# Patient Record
Sex: MALE | Race: WHITE | NOT HISPANIC OR LATINO | Employment: OTHER | RURAL
[De-identification: names, ages, dates, MRNs, and addresses within clinical notes are randomized per-mention and may not be internally consistent; named-entity substitution may affect disease eponyms.]

---

## 2020-03-24 ENCOUNTER — HISTORICAL (OUTPATIENT)
Dept: ADMINISTRATIVE | Facility: HOSPITAL | Age: 70
End: 2020-03-24

## 2020-04-28 ENCOUNTER — HISTORICAL (OUTPATIENT)
Dept: ADMINISTRATIVE | Facility: HOSPITAL | Age: 70
End: 2020-04-28

## 2020-10-23 ENCOUNTER — HISTORICAL (OUTPATIENT)
Dept: ADMINISTRATIVE | Facility: HOSPITAL | Age: 70
End: 2020-10-23

## 2021-03-30 DIAGNOSIS — R51.9 ACUTE NONINTRACTABLE HEADACHE, UNSPECIFIED HEADACHE TYPE: Primary | ICD-10-CM

## 2021-06-21 ENCOUNTER — OFFICE VISIT (OUTPATIENT)
Dept: FAMILY MEDICINE | Facility: CLINIC | Age: 71
End: 2021-06-21
Payer: COMMERCIAL

## 2021-06-21 VITALS
WEIGHT: 205 LBS | HEART RATE: 98 BPM | BODY MASS INDEX: 27.77 KG/M2 | RESPIRATION RATE: 16 BRPM | TEMPERATURE: 98 F | HEIGHT: 72 IN | OXYGEN SATURATION: 99 %

## 2021-06-21 DIAGNOSIS — J40 BRONCHITIS: Primary | ICD-10-CM

## 2021-06-21 DIAGNOSIS — Z11.52 ENCOUNTER FOR SCREENING FOR COVID-19: ICD-10-CM

## 2021-06-21 LAB
CTP QC/QA: YES
FLUAV AG NPH QL: NEGATIVE
FLUBV AG NPH QL: NEGATIVE
SARS-COV-2 AG RESP QL IA.RAPID: NEGATIVE

## 2021-06-21 PROCEDURE — 99214 OFFICE O/P EST MOD 30 MIN: CPT | Mod: ,,, | Performed by: FAMILY MEDICINE

## 2021-06-21 PROCEDURE — 87428 SARSCOV & INF VIR A&B AG IA: CPT | Mod: QW,,, | Performed by: FAMILY MEDICINE

## 2021-06-21 PROCEDURE — 99214 PR OFFICE/OUTPT VISIT, EST, LEVL IV, 30-39 MIN: ICD-10-PCS | Mod: ,,, | Performed by: FAMILY MEDICINE

## 2021-06-21 PROCEDURE — 87428 POCT SARS-COV2 (COVID) WITH FLU ANTIGEN: ICD-10-PCS | Mod: QW,,, | Performed by: FAMILY MEDICINE

## 2021-06-21 RX ORDER — BENZONATATE 100 MG/1
100 CAPSULE ORAL 3 TIMES DAILY PRN
Qty: 20 CAPSULE | Refills: 0 | Status: SHIPPED | OUTPATIENT
Start: 2021-06-21

## 2021-06-21 RX ORDER — PREDNISONE 20 MG/1
20 TABLET ORAL DAILY
Qty: 5 TABLET | Refills: 0 | Status: SHIPPED | OUTPATIENT
Start: 2021-06-21 | End: 2021-06-26

## 2021-06-21 RX ORDER — SIMVASTATIN 20 MG/1
20 TABLET, FILM COATED ORAL NIGHTLY
COMMUNITY

## 2021-06-21 RX ORDER — LISINOPRIL 10 MG/1
10 TABLET ORAL DAILY
COMMUNITY
End: 2022-08-02

## 2021-06-21 RX ORDER — ALBUTEROL SULFATE 90 UG/1
2 AEROSOL, METERED RESPIRATORY (INHALATION) EVERY 6 HOURS PRN
Qty: 8 G | Refills: 0 | Status: SHIPPED | OUTPATIENT
Start: 2021-06-21 | End: 2022-06-21

## 2021-06-21 RX ORDER — FINASTERIDE 5 MG/1
10 TABLET, FILM COATED ORAL DAILY
COMMUNITY
End: 2022-08-09 | Stop reason: SDUPTHER

## 2021-06-21 RX ORDER — AMOXICILLIN AND CLAVULANATE POTASSIUM 875; 125 MG/1; MG/1
1 TABLET, FILM COATED ORAL 2 TIMES DAILY
Qty: 14 TABLET | Refills: 0 | Status: SHIPPED | OUTPATIENT
Start: 2021-06-21 | End: 2021-06-28

## 2021-06-21 RX ORDER — ASPIRIN 81 MG/1
81 TABLET ORAL DAILY
COMMUNITY

## 2021-06-23 ENCOUNTER — HOSPITAL ENCOUNTER (EMERGENCY)
Facility: HOSPITAL | Age: 71
Discharge: HOME | End: 2021-06-23
Payer: COMMERCIAL

## 2021-06-23 VITALS
TEMPERATURE: 99 F | HEART RATE: 79 BPM | SYSTOLIC BLOOD PRESSURE: 118 MMHG | RESPIRATION RATE: 21 BRPM | OXYGEN SATURATION: 96 % | BODY MASS INDEX: 28.35 KG/M2 | DIASTOLIC BLOOD PRESSURE: 65 MMHG | WEIGHT: 209 LBS

## 2021-06-23 DIAGNOSIS — R50.9 FEVER: ICD-10-CM
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
## 2021-06-23 DIAGNOSIS — J12.9 VIRAL PNEUMONIA: Primary | ICD-10-CM

## 2021-06-23 LAB
ALBUMIN SERPL BCP-MCNC: 3.1 G/DL (ref 3.5–5)
ALBUMIN/GLOB SERPL: 0.8 {RATIO}
ALP SERPL-CCNC: 106 U/L (ref 45–115)
ALT SERPL W P-5'-P-CCNC: 112 U/L (ref 16–61)
ANION GAP SERPL CALCULATED.3IONS-SCNC: 15 MMOL/L (ref 7–16)
AST SERPL W P-5'-P-CCNC: 79 U/L (ref 15–37)
BACTERIA #/AREA URNS HPF: ABNORMAL /HPF
BASOPHILS # BLD AUTO: 0.03 K/UL (ref 0–0.2)
BASOPHILS NFR BLD AUTO: 0.4 % (ref 0–1)
BILIRUB SERPL-MCNC: 0.5 MG/DL (ref 0–1.2)
BILIRUB UR QL STRIP: NEGATIVE
BUN SERPL-MCNC: 28 MG/DL (ref 7–18)
BUN/CREAT SERPL: 24 (ref 6–20)
CALCIUM SERPL-MCNC: 8.8 MG/DL (ref 8.5–10.1)
CHLORIDE SERPL-SCNC: 102 MMOL/L (ref 98–107)
CLARITY UR: CLEAR
CO2 SERPL-SCNC: 22 MMOL/L (ref 21–32)
COLOR UR: YELLOW
CREAT SERPL-MCNC: 1.15 MG/DL (ref 0.7–1.3)
DIFFERENTIAL METHOD BLD: ABNORMAL
EOSINOPHIL # BLD AUTO: 0.1 K/UL (ref 0–0.5)
EOSINOPHIL NFR BLD AUTO: 1.4 % (ref 1–4)
EOSINOPHIL NFR BLD MANUAL: 1 % (ref 1–4)
ERYTHROCYTE [DISTWIDTH] IN BLOOD BY AUTOMATED COUNT: 12.3 % (ref 11.5–14.5)
GLOBULIN SER-MCNC: 3.7 G/DL (ref 2–4)
GLUCOSE SERPL-MCNC: 148 MG/DL (ref 74–106)
GLUCOSE UR STRIP-MCNC: NEGATIVE MG/DL
HCT VFR BLD AUTO: 41.3 % (ref 40–54)
HGB BLD-MCNC: 14.6 G/DL (ref 13.5–18)
IMM GRANULOCYTES # BLD AUTO: 0.04 K/UL (ref 0–0.04)
IMM GRANULOCYTES NFR BLD: 0.6 % (ref 0–0.4)
KETONES UR STRIP-SCNC: NEGATIVE MG/DL
LEUKOCYTE ESTERASE UR QL STRIP: NEGATIVE
LYMPHOCYTES # BLD AUTO: 0.77 K/UL (ref 1–4.8)
LYMPHOCYTES NFR BLD AUTO: 10.8 % (ref 27–41)
LYMPHOCYTES NFR BLD MANUAL: 12 % (ref 27–41)
MCH RBC QN AUTO: 30.9 PG (ref 27–31)
MCHC RBC AUTO-ENTMCNC: 35.4 G/DL (ref 32–36)
MCV RBC AUTO: 87.3 FL (ref 80–96)
MONOCYTES # BLD AUTO: 0.61 K/UL (ref 0–0.8)
MONOCYTES NFR BLD AUTO: 8.5 % (ref 2–6)
MONOCYTES NFR BLD MANUAL: 5 % (ref 2–6)
MPC BLD CALC-MCNC: 11.2 FL (ref 9.4–12.4)
MUCOUS THREADS #/AREA URNS HPF: ABNORMAL /HPF
NEUTROPHILS # BLD AUTO: 5.59 K/UL (ref 1.8–7.7)
NEUTROPHILS NFR BLD AUTO: 78.3 % (ref 53–65)
NEUTS BAND NFR BLD MANUAL: 18 % (ref 1–5)
NEUTS SEG NFR BLD MANUAL: 64 % (ref 50–62)
NITRITE UR QL STRIP: NEGATIVE
NRBC # BLD AUTO: 0 X10E3/UL
NRBC, AUTO (.00): 0 %
PH UR STRIP: 6 PH UNITS
PLATELET # BLD AUTO: 146 K/UL (ref 150–400)
PLATELET MORPHOLOGY: ABNORMAL
POTASSIUM SERPL-SCNC: 3.4 MMOL/L (ref 3.5–5.1)
PROT SERPL-MCNC: 6.8 G/DL (ref 6.4–8.2)
PROT UR QL STRIP: NEGATIVE
RBC # BLD AUTO: 4.73 M/UL (ref 4.6–6.2)
RBC # UR STRIP: ABNORMAL /UL
RBC #/AREA URNS HPF: ABNORMAL /HPF
RBC MORPH BLD: NORMAL
SODIUM SERPL-SCNC: 136 MMOL/L (ref 136–145)
SP GR UR STRIP: 1.01
SQUAMOUS #/AREA URNS LPF: ABNORMAL /LPF
UROBILINOGEN UR STRIP-ACNC: 1 MG/DL
WBC # BLD AUTO: 7.14 K/UL (ref 4.5–11)
WBC #/AREA URNS HPF: ABNORMAL /HPF

## 2021-06-23 PROCEDURE — 80053 COMPREHEN METABOLIC PANEL: CPT | Performed by: NURSE PRACTITIONER

## 2021-06-23 PROCEDURE — 96361 HYDRATE IV INFUSION ADD-ON: CPT

## 2021-06-23 PROCEDURE — 25000003 PHARM REV CODE 250: Performed by: NURSE PRACTITIONER

## 2021-06-23 PROCEDURE — 81001 URINALYSIS AUTO W/SCOPE: CPT | Performed by: NURSE PRACTITIONER

## 2021-06-23 PROCEDURE — 99283 PR EMERGENCY DEPT VISIT,LEVEL III: ICD-10-PCS | Mod: ,,, | Performed by: FAMILY MEDICINE

## 2021-06-23 PROCEDURE — 99283 EMERGENCY DEPT VISIT LOW MDM: CPT | Mod: ,,, | Performed by: FAMILY MEDICINE

## 2021-06-23 PROCEDURE — 85025 COMPLETE CBC W/AUTO DIFF WBC: CPT | Performed by: NURSE PRACTITIONER

## 2021-06-23 PROCEDURE — 36415 COLL VENOUS BLD VENIPUNCTURE: CPT | Performed by: NURSE PRACTITIONER

## 2021-06-23 PROCEDURE — 87040 BLOOD CULTURE FOR BACTERIA: CPT | Performed by: NURSE PRACTITIONER

## 2021-06-23 PROCEDURE — 96360 HYDRATION IV INFUSION INIT: CPT

## 2021-06-23 PROCEDURE — 86757 RICKETTSIA ANTIBODY: CPT | Mod: 90 | Performed by: NURSE PRACTITIONER

## 2021-06-23 PROCEDURE — 99284 EMERGENCY DEPT VISIT MOD MDM: CPT | Mod: 25

## 2021-06-23 PROCEDURE — 86617 LYME DISEASE ANTIBODY: CPT | Performed by: NURSE PRACTITIONER

## 2021-06-23 RX ORDER — POTASSIUM CHLORIDE 20 MEQ/1
20 TABLET, EXTENDED RELEASE ORAL
Status: COMPLETED | OUTPATIENT
Start: 2021-06-23 | End: 2021-06-23

## 2021-06-23 RX ORDER — TAMSULOSIN HYDROCHLORIDE 0.4 MG/1
CAPSULE ORAL
COMMUNITY
Start: 2020-09-03 | End: 2022-08-09 | Stop reason: SDUPTHER

## 2021-06-23 RX ORDER — LISINOPRIL 20 MG/1
TABLET ORAL
COMMUNITY
Start: 2020-09-03 | End: 2022-08-02

## 2021-06-23 RX ORDER — DOXYCYCLINE 100 MG/1
100 CAPSULE ORAL 2 TIMES DAILY
Qty: 28 CAPSULE | Refills: 0 | Status: SHIPPED | OUTPATIENT
Start: 2021-06-23 | End: 2021-07-07

## 2021-06-23 RX ADMIN — POTASSIUM CHLORIDE 20 MEQ: 1500 TABLET, EXTENDED RELEASE ORAL at 10:06

## 2021-06-23 RX ADMIN — SODIUM CHLORIDE 1000 ML: 9 INJECTION, SOLUTION INTRAVENOUS at 10:06

## 2021-06-24 LAB
RICK SF IGG TITR SER IF: ABNORMAL {TITER}
RICK SF IGM TITR SER IF: ABNORMAL {TITER}

## 2021-06-29 LAB
BACTERIA BLD CULT: NORMAL
BACTERIA BLD CULT: NORMAL

## 2021-06-30 LAB — B BURGDOR IGG SER QL IB: NORMAL

## 2021-07-08 DIAGNOSIS — E29.1 TESTICULAR HYPOFUNCTION: Primary | ICD-10-CM

## 2021-07-12 ENCOUNTER — TELEPHONE (OUTPATIENT)
Dept: UROLOGY | Facility: CLINIC | Age: 71
End: 2021-07-12

## 2021-07-16 ENCOUNTER — HOSPITAL ENCOUNTER (OUTPATIENT)
Dept: RADIOLOGY | Facility: HOSPITAL | Age: 71
Discharge: HOME | End: 2021-07-16
Attending: INTERNAL MEDICINE
Payer: COMMERCIAL

## 2021-07-16 DIAGNOSIS — R79.89 ABNORMAL LIVER FUNCTION TEST: ICD-10-CM

## 2021-07-16 PROCEDURE — 76700 US EXAM ABDOM COMPLETE: CPT | Mod: 26,,,

## 2021-07-16 PROCEDURE — 76700 US ABDOMEN COMPLETE: ICD-10-PCS | Mod: 26,,,

## 2021-07-16 PROCEDURE — 76700 US EXAM ABDOM COMPLETE: CPT | Mod: TC

## 2021-07-26 ENCOUNTER — OFFICE VISIT (OUTPATIENT)
Dept: INTERNAL MEDICINE | Facility: CLINIC | Age: 71
End: 2021-07-26
Payer: COMMERCIAL

## 2021-07-26 VITALS
SYSTOLIC BLOOD PRESSURE: 132 MMHG | HEART RATE: 63 BPM | DIASTOLIC BLOOD PRESSURE: 82 MMHG | HEIGHT: 72 IN | OXYGEN SATURATION: 97 % | WEIGHT: 205 LBS | BODY MASS INDEX: 27.77 KG/M2 | RESPIRATION RATE: 16 BRPM

## 2021-07-26 DIAGNOSIS — Z76.89 ENCOUNTER TO ESTABLISH CARE WITH NEW DOCTOR: Primary | ICD-10-CM

## 2021-07-26 DIAGNOSIS — N40.0 BENIGN PROSTATIC HYPERPLASIA, UNSPECIFIED WHETHER LOWER URINARY TRACT SYMPTOMS PRESENT: ICD-10-CM

## 2021-07-26 DIAGNOSIS — I10 ESSENTIAL HYPERTENSION: ICD-10-CM

## 2021-07-26 PROCEDURE — 3008F BODY MASS INDEX DOCD: CPT | Mod: ,,, | Performed by: INTERNAL MEDICINE

## 2021-07-26 PROCEDURE — 99203 OFFICE O/P NEW LOW 30 MIN: CPT | Mod: S$PBB,,, | Performed by: INTERNAL MEDICINE

## 2021-07-26 PROCEDURE — 3008F PR BODY MASS INDEX (BMI) DOCUMENTED: ICD-10-PCS | Mod: ,,, | Performed by: INTERNAL MEDICINE

## 2021-07-26 PROCEDURE — 1159F MED LIST DOCD IN RCRD: CPT | Mod: ,,, | Performed by: INTERNAL MEDICINE

## 2021-07-26 PROCEDURE — 3288F PR FALLS RISK ASSESSMENT DOCUMENTED: ICD-10-PCS | Mod: ,,, | Performed by: INTERNAL MEDICINE

## 2021-07-26 PROCEDURE — 1101F PR PT FALLS ASSESS DOC 0-1 FALLS W/OUT INJ PAST YR: ICD-10-PCS | Mod: ,,, | Performed by: INTERNAL MEDICINE

## 2021-07-26 PROCEDURE — 3288F FALL RISK ASSESSMENT DOCD: CPT | Mod: ,,, | Performed by: INTERNAL MEDICINE

## 2021-07-26 PROCEDURE — 1101F PT FALLS ASSESS-DOCD LE1/YR: CPT | Mod: ,,, | Performed by: INTERNAL MEDICINE

## 2021-07-26 PROCEDURE — 99203 PR OFFICE/OUTPT VISIT, NEW, LEVL III, 30-44 MIN: ICD-10-PCS | Mod: S$PBB,,, | Performed by: INTERNAL MEDICINE

## 2021-07-26 PROCEDURE — 1159F PR MEDICATION LIST DOCUMENTED IN MEDICAL RECORD: ICD-10-PCS | Mod: ,,, | Performed by: INTERNAL MEDICINE

## 2021-07-26 PROCEDURE — 99214 OFFICE O/P EST MOD 30 MIN: CPT | Mod: PBBFAC | Performed by: INTERNAL MEDICINE

## 2021-07-26 PROCEDURE — 1126F PR PAIN SEVERITY QUANTIFIED, NO PAIN PRESENT: ICD-10-PCS | Mod: ,,, | Performed by: INTERNAL MEDICINE

## 2021-07-26 PROCEDURE — 1126F AMNT PAIN NOTED NONE PRSNT: CPT | Mod: ,,, | Performed by: INTERNAL MEDICINE

## 2021-07-26 RX ORDER — MONTELUKAST SODIUM 10 MG/1
TABLET ORAL
COMMUNITY
Start: 2021-06-25

## 2021-07-28 ENCOUNTER — OFFICE VISIT (OUTPATIENT)
Dept: UROLOGY | Facility: CLINIC | Age: 71
End: 2021-07-28
Payer: COMMERCIAL

## 2021-07-28 VITALS
SYSTOLIC BLOOD PRESSURE: 144 MMHG | HEART RATE: 63 BPM | HEIGHT: 72 IN | WEIGHT: 211 LBS | DIASTOLIC BLOOD PRESSURE: 75 MMHG | BODY MASS INDEX: 28.58 KG/M2

## 2021-07-28 DIAGNOSIS — R53.83 FATIGUE, UNSPECIFIED TYPE: ICD-10-CM

## 2021-07-28 DIAGNOSIS — N40.1 BENIGN PROSTATIC HYPERPLASIA WITH URINARY OBSTRUCTION: ICD-10-CM

## 2021-07-28 DIAGNOSIS — N13.8 BENIGN PROSTATIC HYPERPLASIA WITH URINARY OBSTRUCTION: ICD-10-CM

## 2021-07-28 DIAGNOSIS — E29.1 TESTICULAR HYPOFUNCTION: Primary | ICD-10-CM

## 2021-07-28 PROCEDURE — 3078F DIAST BP <80 MM HG: CPT | Mod: ,,, | Performed by: UROLOGY

## 2021-07-28 PROCEDURE — 1159F PR MEDICATION LIST DOCUMENTED IN MEDICAL RECORD: ICD-10-PCS | Mod: ,,, | Performed by: UROLOGY

## 2021-07-28 PROCEDURE — 3008F PR BODY MASS INDEX (BMI) DOCUMENTED: ICD-10-PCS | Mod: ,,, | Performed by: UROLOGY

## 2021-07-28 PROCEDURE — 99213 PR OFFICE/OUTPT VISIT, EST, LEVL III, 20-29 MIN: ICD-10-PCS | Mod: S$PBB,,, | Performed by: UROLOGY

## 2021-07-28 PROCEDURE — 99214 OFFICE O/P EST MOD 30 MIN: CPT | Mod: PBBFAC | Performed by: UROLOGY

## 2021-07-28 PROCEDURE — 3077F SYST BP >= 140 MM HG: CPT | Mod: ,,, | Performed by: UROLOGY

## 2021-07-28 PROCEDURE — 3077F PR MOST RECENT SYSTOLIC BLOOD PRESSURE >= 140 MM HG: ICD-10-PCS | Mod: ,,, | Performed by: UROLOGY

## 2021-07-28 PROCEDURE — 3008F BODY MASS INDEX DOCD: CPT | Mod: ,,, | Performed by: UROLOGY

## 2021-07-28 PROCEDURE — 1101F PT FALLS ASSESS-DOCD LE1/YR: CPT | Mod: ,,, | Performed by: UROLOGY

## 2021-07-28 PROCEDURE — 99213 OFFICE O/P EST LOW 20 MIN: CPT | Mod: S$PBB,,, | Performed by: UROLOGY

## 2021-07-28 PROCEDURE — 3078F PR MOST RECENT DIASTOLIC BLOOD PRESSURE < 80 MM HG: ICD-10-PCS | Mod: ,,, | Performed by: UROLOGY

## 2021-07-28 PROCEDURE — 3288F FALL RISK ASSESSMENT DOCD: CPT | Mod: ,,, | Performed by: UROLOGY

## 2021-07-28 PROCEDURE — 3288F PR FALLS RISK ASSESSMENT DOCUMENTED: ICD-10-PCS | Mod: ,,, | Performed by: UROLOGY

## 2021-07-28 PROCEDURE — 1159F MED LIST DOCD IN RCRD: CPT | Mod: ,,, | Performed by: UROLOGY

## 2021-07-28 PROCEDURE — 1101F PR PT FALLS ASSESS DOC 0-1 FALLS W/OUT INJ PAST YR: ICD-10-PCS | Mod: ,,, | Performed by: UROLOGY

## 2021-07-28 RX ORDER — CYANOCOBALAMIN 1000 UG/ML
INJECTION, SOLUTION INTRAMUSCULAR; SUBCUTANEOUS
COMMUNITY
Start: 2021-07-24

## 2021-07-28 RX ORDER — SILDENAFIL 100 MG/1
TABLET, FILM COATED ORAL
COMMUNITY
Start: 2021-03-04

## 2021-07-28 RX ORDER — OMEPRAZOLE 20 MG/1
20 CAPSULE, DELAYED RELEASE ORAL DAILY
COMMUNITY
Start: 2021-07-24

## 2021-07-28 RX ORDER — DUTASTERIDE 0.5 MG/1
1 CAPSULE, LIQUID FILLED ORAL DAILY
COMMUNITY
Start: 2021-03-04 | End: 2022-07-08 | Stop reason: SDUPTHER

## 2021-09-30 DIAGNOSIS — E29.9 TESTICULAR DYSFUNCTION: ICD-10-CM

## 2021-09-30 DIAGNOSIS — R53.83 OTHER FATIGUE: Primary | ICD-10-CM

## 2021-11-08 ENCOUNTER — PATIENT OUTREACH (OUTPATIENT)
Dept: PRIMARY CARE CLINIC | Facility: CLINIC | Age: 71
End: 2021-11-08
Payer: COMMERCIAL

## 2021-11-22 RX ORDER — GABAPENTIN 400 MG/1
400 CAPSULE ORAL 3 TIMES DAILY
Qty: 30 CAPSULE | Refills: 1 | Status: SHIPPED | OUTPATIENT
Start: 2021-11-22 | End: 2022-11-22

## 2021-11-22 RX ORDER — ACYCLOVIR 400 MG/1
800 TABLET ORAL 4 TIMES DAILY
Qty: 40 TABLET | Refills: 1 | Status: SHIPPED | OUTPATIENT
Start: 2021-11-22 | End: 2022-11-22

## 2022-02-03 ENCOUNTER — OFFICE VISIT (OUTPATIENT)
Dept: INTERNAL MEDICINE | Facility: CLINIC | Age: 72
End: 2022-02-03
Payer: COMMERCIAL

## 2022-02-03 VITALS
BODY MASS INDEX: 29.12 KG/M2 | TEMPERATURE: 98 F | OXYGEN SATURATION: 98 % | DIASTOLIC BLOOD PRESSURE: 72 MMHG | SYSTOLIC BLOOD PRESSURE: 126 MMHG | HEIGHT: 72 IN | HEART RATE: 88 BPM | WEIGHT: 215 LBS

## 2022-02-03 DIAGNOSIS — I10 ESSENTIAL HYPERTENSION: ICD-10-CM

## 2022-02-03 DIAGNOSIS — R05.9 COUGH: ICD-10-CM

## 2022-02-03 DIAGNOSIS — E78.5 DYSLIPIDEMIA: ICD-10-CM

## 2022-02-03 DIAGNOSIS — N40.0 BENIGN PROSTATIC HYPERPLASIA, UNSPECIFIED WHETHER LOWER URINARY TRACT SYMPTOMS PRESENT: ICD-10-CM

## 2022-02-03 DIAGNOSIS — R11.0 NAUSEA: ICD-10-CM

## 2022-02-03 DIAGNOSIS — M25.562 ACUTE PAIN OF LEFT KNEE: ICD-10-CM

## 2022-02-03 DIAGNOSIS — Z09 FOLLOW UP: Primary | ICD-10-CM

## 2022-02-03 PROCEDURE — 1126F PR PAIN SEVERITY QUANTIFIED, NO PAIN PRESENT: ICD-10-PCS | Mod: ,,, | Performed by: INTERNAL MEDICINE

## 2022-02-03 PROCEDURE — 1101F PT FALLS ASSESS-DOCD LE1/YR: CPT | Mod: ,,, | Performed by: INTERNAL MEDICINE

## 2022-02-03 PROCEDURE — 3008F PR BODY MASS INDEX (BMI) DOCUMENTED: ICD-10-PCS | Mod: ,,, | Performed by: INTERNAL MEDICINE

## 2022-02-03 PROCEDURE — 1159F MED LIST DOCD IN RCRD: CPT | Mod: ,,, | Performed by: INTERNAL MEDICINE

## 2022-02-03 PROCEDURE — 3074F PR MOST RECENT SYSTOLIC BLOOD PRESSURE < 130 MM HG: ICD-10-PCS | Mod: ,,, | Performed by: INTERNAL MEDICINE

## 2022-02-03 PROCEDURE — 99214 OFFICE O/P EST MOD 30 MIN: CPT | Mod: S$PBB,,, | Performed by: INTERNAL MEDICINE

## 2022-02-03 PROCEDURE — 1126F AMNT PAIN NOTED NONE PRSNT: CPT | Mod: ,,, | Performed by: INTERNAL MEDICINE

## 2022-02-03 PROCEDURE — 3078F DIAST BP <80 MM HG: CPT | Mod: ,,, | Performed by: INTERNAL MEDICINE

## 2022-02-03 PROCEDURE — 3008F BODY MASS INDEX DOCD: CPT | Mod: ,,, | Performed by: INTERNAL MEDICINE

## 2022-02-03 PROCEDURE — 99215 OFFICE O/P EST HI 40 MIN: CPT | Mod: PBBFAC | Performed by: INTERNAL MEDICINE

## 2022-02-03 PROCEDURE — 3288F PR FALLS RISK ASSESSMENT DOCUMENTED: ICD-10-PCS | Mod: ,,, | Performed by: INTERNAL MEDICINE

## 2022-02-03 PROCEDURE — 3288F FALL RISK ASSESSMENT DOCD: CPT | Mod: ,,, | Performed by: INTERNAL MEDICINE

## 2022-02-03 PROCEDURE — 1159F PR MEDICATION LIST DOCUMENTED IN MEDICAL RECORD: ICD-10-PCS | Mod: ,,, | Performed by: INTERNAL MEDICINE

## 2022-02-03 PROCEDURE — 3078F PR MOST RECENT DIASTOLIC BLOOD PRESSURE < 80 MM HG: ICD-10-PCS | Mod: ,,, | Performed by: INTERNAL MEDICINE

## 2022-02-03 PROCEDURE — 1101F PR PT FALLS ASSESS DOC 0-1 FALLS W/OUT INJ PAST YR: ICD-10-PCS | Mod: ,,, | Performed by: INTERNAL MEDICINE

## 2022-02-03 PROCEDURE — 3074F SYST BP LT 130 MM HG: CPT | Mod: ,,, | Performed by: INTERNAL MEDICINE

## 2022-02-03 PROCEDURE — 99214 PR OFFICE/OUTPT VISIT, EST, LEVL IV, 30-39 MIN: ICD-10-PCS | Mod: S$PBB,,, | Performed by: INTERNAL MEDICINE

## 2022-02-03 RX ORDER — AMLODIPINE BESYLATE 10 MG/1
10 TABLET ORAL DAILY PRN
COMMUNITY

## 2022-02-03 RX ORDER — PREDNISONE 20 MG/1
20 TABLET ORAL DAILY
Qty: 7 TABLET | Refills: 1 | Status: SHIPPED | OUTPATIENT
Start: 2022-02-03

## 2022-02-03 RX ORDER — TESTOSTERONE GEL, 1% 10 MG/G
GEL TRANSDERMAL DAILY
COMMUNITY
End: 2022-10-10

## 2022-02-03 NOTE — PROGRESS NOTES
Subjective:       Patient ID: Alon Felipe is a 72 y.o. male.    Chief Complaint: Elevated bood pressure readings (Highest reading 198/10/ on 02/01. Lowest reading 133/87 today.), Shortness of Breath (X1 week. ), Dizziness (Intermittent X1 month), and Excess phlegm in the mornings    The patient is a 71-year-old white male the presents today to establish care.  He has a history of hypertension, chronic low back issues, and BPH.  He has just recently completed treatment for community-acquired pneumonia.  He states that he is feeling much better.  He recently saw Dr. Harrison (pulmonologist).  Health maintenance issues are up-to-date.  He has received both doses of COVID vaccine.  Currently no acute issues or complaints.  He is resting comfortably today in no distress.  He is afebrile and vital signs are stable.    2/3/2022-the patient presents today for follow-up.  Over the last few days at home he has been checking his blood pressure and it has been elevated.  He does have a new blood pressure cough.  He is on lisinopril 20 mg daily.  He also as amlodipine 5 mg that he has been taking nightly.  Blood pressure looks great today.  It is 126/72.  He also complains of some left knee pain when trying to walk up stairs.  No known injury.  He continues to have issues with his GI system.  Immediately or shortly after eating he has to go to the restroom.  He denies constipation or diarrhea.  He denies melena or hematochezia.  Today he is resting comfortably in no distress.  He is currently afebrile and vital signs are stable.    Hypertension  This is a recurrent problem. The current episode started more than 1 year ago. The problem has been gradually worsening since onset. The problem is resistant. Associated symptoms include blurred vision, malaise/fatigue, orthopnea, PND and shortness of breath. Pertinent negatives include no anxiety, chest pain, headaches, neck pain, palpitations, peripheral edema or sweats. There are no  associated agents to hypertension. There are no known risk factors for coronary artery disease. Past treatments include nothing. The current treatment provides moderate improvement. There are no compliance problems.      Review of Systems   Constitutional: Positive for malaise/fatigue. Negative for appetite change, chills and fever.   HENT: Negative for ear pain, hearing loss, sinus pressure/congestion and sore throat.    Eyes: Positive for blurred vision. Negative for pain, redness and visual disturbance.   Respiratory: Positive for shortness of breath. Negative for apnea, cough and wheezing.    Cardiovascular: Positive for orthopnea and PND. Negative for chest pain, palpitations and leg swelling.   Gastrointestinal: Positive for change in bowel habit, nausea and change in bowel habit. Negative for abdominal pain, blood in stool, constipation and diarrhea.   Endocrine: Negative for cold intolerance, heat intolerance and polyuria.   Genitourinary: Negative for dysuria and hematuria.   Musculoskeletal: Positive for arthralgias. Negative for back pain, joint swelling, myalgias and neck pain.   Integumentary:  Negative for pallor and rash.   Allergic/Immunologic: Negative for frequent infections.   Neurological: Negative for tremors, seizures, weakness and headaches.   Hematological: Negative for adenopathy.   Psychiatric/Behavioral: Negative for confusion, dysphoric mood and sleep disturbance. The patient is not nervous/anxious.          Objective:      Physical Exam  Vitals and nursing note reviewed.   Constitutional:       General: He is not in acute distress.     Appearance: Normal appearance. He is not ill-appearing.   HENT:      Head: Normocephalic and atraumatic.      Right Ear: External ear normal.      Left Ear: External ear normal.      Nose: Nose normal.      Mouth/Throat:      Pharynx: Oropharynx is clear.   Eyes:      Extraocular Movements: Extraocular movements intact.      Conjunctiva/sclera:  Conjunctivae normal.      Pupils: Pupils are equal, round, and reactive to light.   Neck:      Vascular: No carotid bruit.   Cardiovascular:      Rate and Rhythm: Normal rate and regular rhythm.      Pulses: Normal pulses.      Heart sounds: Normal heart sounds. No murmur heard.      Pulmonary:      Effort: No respiratory distress.      Breath sounds: Normal breath sounds. No wheezing or rales.   Abdominal:      General: Bowel sounds are normal. There is no distension.      Palpations: Abdomen is soft.   Musculoskeletal:         General: Normal range of motion.      Cervical back: Normal range of motion and neck supple.      Right lower leg: No edema.      Left lower leg: No edema.   Skin:     General: Skin is warm and dry.      Capillary Refill: Capillary refill takes less than 2 seconds.      Coloration: Skin is not pale.   Neurological:      General: No focal deficit present.      Mental Status: He is alert and oriented to person, place, and time.      Cranial Nerves: No cranial nerve deficit.      Sensory: No sensory deficit.      Motor: No weakness.   Psychiatric:         Mood and Affect: Mood normal.         Judgment: Judgment normal.         Assessment:       1. Follow up    2. Essential hypertension    3. Benign prostatic hyperplasia, unspecified whether lower urinary tract symptoms present    4. Acute pain of left knee    5. Dyslipidemia    6. Cough    7. Nausea        Plan:       1. Patient is here for follow-up.    2. Essential hypertension-blood pressure looks good today.  He is currently on lisinopril 20 mg daily and amlodipine 5 mg at bedtime.  Blood pressure is 126/72 today.  He brings in blood pressure measurements over the last couple of days and show some elevated blood pressures.  He recently got a new blood pressure cuff.  We are going to have him bring it by the office so we can titrate it to make sure it is working appropriately.    3. BPH-he follows with Dr. Zepeda.  He is currently on  finasteride 5 mg daily and Flomax 0.4 mg daily.  Currently no acute issues.    4. Nausea-continues to have some GI issues where her shortly after eating he has to rush to the bathroom.  Still has his gallbladder in place.  He denies any significant reflux recently.  We are going to check a gallbladder ultrasound.  This is normal we will perform a HIDA scan.    5. Dyslipidemia-continue with current care    6. Left knee pain- worse with climbing stairs. Possible patellofemoral syndrome? Meniscal tear? We will refer to Dr. Acevedo in orthopedics.    7. Chronic cough-felt to be related to sinus drainage.  He recently tried some Mucinex but did not help much.  We are going to try Flonase at bedtime to see if this will help    Return to care in 6 months for routine follow-up.  Sooner if needed.      He recently had a lot of lab work performed with his acute illness.  No need to repeat any labs at this time.  We will check a lipid panel at his next visit.  Return to care in 6 months or sooner if needed.

## 2022-02-11 ENCOUNTER — HOSPITAL ENCOUNTER (OUTPATIENT)
Dept: RADIOLOGY | Facility: HOSPITAL | Age: 72
Discharge: HOME | End: 2022-02-11
Attending: INTERNAL MEDICINE
Payer: COMMERCIAL

## 2022-02-11 DIAGNOSIS — R11.0 NAUSEA: ICD-10-CM

## 2022-02-11 PROCEDURE — 76705 US ABDOMEN LIMITED: ICD-10-PCS | Mod: 26,,, | Performed by: RADIOLOGY

## 2022-02-11 PROCEDURE — 76705 ECHO EXAM OF ABDOMEN: CPT | Mod: TC

## 2022-02-11 PROCEDURE — 76705 ECHO EXAM OF ABDOMEN: CPT | Mod: 26,,, | Performed by: RADIOLOGY

## 2022-02-14 ENCOUNTER — HOSPITAL ENCOUNTER (OUTPATIENT)
Dept: RADIOLOGY | Facility: HOSPITAL | Age: 72
Discharge: HOME | End: 2022-02-14
Attending: ORTHOPAEDIC SURGERY
Payer: COMMERCIAL

## 2022-02-14 DIAGNOSIS — M25.562 LEFT KNEE PAIN, UNSPECIFIED CHRONICITY: ICD-10-CM

## 2022-02-14 PROBLEM — M76.52 PATELLAR TENDINITIS OF LEFT KNEE: Status: ACTIVE | Noted: 2022-02-14

## 2022-02-14 PROCEDURE — 73564 X-RAY EXAM KNEE 4 OR MORE: CPT | Mod: TC,LT

## 2022-02-15 ENCOUNTER — CLINICAL SUPPORT (OUTPATIENT)
Dept: REHABILITATION | Facility: HOSPITAL | Age: 72
End: 2022-02-15
Attending: ORTHOPAEDIC SURGERY
Payer: COMMERCIAL

## 2022-02-15 DIAGNOSIS — M62.9 HAMSTRING TIGHTNESS OF BOTH LOWER EXTREMITIES: ICD-10-CM

## 2022-02-15 DIAGNOSIS — M25.562 ACUTE PAIN OF LEFT KNEE: Primary | ICD-10-CM

## 2022-02-15 DIAGNOSIS — M76.52 PATELLAR TENDINITIS OF LEFT KNEE: ICD-10-CM

## 2022-02-15 PROCEDURE — 97161 PT EVAL LOW COMPLEX 20 MIN: CPT

## 2022-02-15 PROCEDURE — 97140 MANUAL THERAPY 1/> REGIONS: CPT

## 2022-02-15 NOTE — PLAN OF CARE
[facility]  Physical Therapy Initial Evaluation    Name: Alon Felipe  Clinic Number: [id number]    Therapy Diagnosis:   Encounter Diagnosis   Name Primary?    Patellar tendinitis of left knee      Physician: Alon Acevedo MD    Physician Orders: PT Eval and Treat   Medical Diagnosis from Referral: left knee pain / patellar tendonitis  Evaluation Date: 2/15/2022  Authorization Period Expiration: 12/31/2022  Plan of Care Expiration: 3/18/2022  Progress Note Due: n/a  Visit # / Visits authorized: 1/ 20    Time In: 1046 am  Time Out: 1128 am  Total Appointment Time (timed & untimed codes): 42 minutes    Precautions: Standard    Subjective   Date of onset: 4-5 months  History of current condition - NOELLE reports: over the last 4-5 months he has been having severe knee pain on the left when going up and down stairs, squatting, or twisting on it - walking or riding his stationary bike do not bother him - he can feel it a little but nothing like when he is on stairs - MD does not think he needs any kind of surgery      Medical History:   Past Medical History:   Diagnosis Date    Enlarged prostate     Hypertension     Stroke        Surgical History:   Alon Felipe  has a past surgical history that includes Back surgery.    Medications:   Alon has a current medication list which includes the following prescription(s): albuterol, amlodipine, aspirin, benzonatate, cyanocobalamin, dutasteride, finasteride, lisinopril, lisinopril, montelukast, omeprazole, prednisone, sildenafil, simvastatin, tamsulosin, and testosterone.    Allergies:   Review of patient's allergies indicates:   Allergen Reactions    Iodine and iodide containing products Itching and Swelling        Imaging, xrays    Prior Therapy: none  Social History:  lives with their family  Occupation:   Prior Level of Function: independent   Current Level of Function: pain with stairs but otherwise independent     Pain:  Current 0/10, worst  6/10, best 0/10   Location: left knee   Description: Burning and Deep  Aggravating Factors: stairs, squatting, twisting  Easing Factors: stopping painful activity    Pts goals: walk up and down stairs without pain in left knee    Objective       Range of motion:  Motion Right Left    Knee extension  WITHIN FUNCTIONAL LIMITS  WITHIN FUNCTIONAL LIMITS   Knee flexion  WITHIN FUNCTIONAL LIMITS  WITHIN FUNCTIONAL LIMITS   Ankle DF  0 degrees  0 degrees     Hamstring Length 90/90: right -45 degrees; left -50 degrees    Manual muscle test   Muscle Right  Left    Knee extension  MMT strength: 5/5  MMT strength: 5/5   Knee flexion  MMT strength: 5/5  MMT strength: 5/5       Gait:  Weight bearing precautions: FWB  Assistive device: none  Ambulation distance and deviations: no significant gait deviations  Stairs: able to do reciprocal pattern but is painful on left - educated patient on going up with right lower extremity first and descending with left lower extremity first  Comments:      Clinical Special Tests:    B. Knee  1. Anterior drawer:  left Negative  2. Posterior drawer: left Negative  3. Varus stress test:  left Negative  4. Valgus stress test:  left Negative  5. PFJ grind test:  left Negative  6. McMurrays:  left Negative      Comments: significant hypertrophy of tibial tuberosity    Limitation/Restriction for FOTO Knee Survey    Therapist reviewed FOTO scores for Alon Felipe on 2/15/2022.   FOTO documents entered into [employer] - see Media section.    Intake Score: 56         TREATMENT     Total Treatment time (time-based codes) separate from Evaluation: 11 minutes    NOELLE received the treatments listed below:  THERAPEUTIC EXERCISES to develop strength, ROM and flexibility for 5 minutes including hamstring stretching in sitting and supine, standing calf stretch, and SLR   MANUAL THERAPY TECHNIQUES including passive stretching to left hamstrings and Mulligan stretch (ER/ADD/FLEX) left hip were applied to left lower  extremity for 6 minutes.    Home Exercises and Patient Education Provided    Education provided:   - evaluation results, plan of care and home exercise program     Written Home Exercises Provided: yes.  Exercises were reviewed and NOELLE was able to demonstrate them prior to the end of the session.  NOELLE demonstrated good  understanding of the education provided.     See EMR under Patient Instructions for exercises provided 2/15/2022.    Assessment   Alon is a 72 y.o. male referred to outpatient Physical Therapy with a medical diagnosis of left knee pain. Pt presents with complaints of left knee pain going up and down stairs and also with squatting. He has a very hypertrophic tibial tuberosity and is tender to palpation at the inferior pole of the patella. He has EXTREMELY tight hamstrings bilaterally. He also has tight heel cords as well. He has a good quad contraction and can do a straight leg raise without a quad lag. Patient would benefit from stretching to hamstrings and gastroc-soleus complex to improve flexibility and modalities to decrease inflammation of patellar tendon. He is very motivated to improve.     Pt prognosis is Good.   Pt will benefit from skilled outpatient Physical Therapy to address the deficits stated above and in the chart below, provide pt/family education, and to maximize pt's level of independence.     Plan of care discussed with patient: Yes  Pt's spiritual, cultural and educational needs considered and patient is agreeable to the plan of care and goals as stated below:     Anticipated Barriers for therapy: none    Medical Necessity is demonstrated by the following  History  Co-morbidities and personal factors that may impact the plan of care Co-morbidities:   see PMH above    Personal Factors:   no deficits     low   Examination  Body Structures and Functions, activity limitations and participation restrictions that may impact the plan of care Body Regions:   lower extremities    Body  Systems:    ROM  flexibility    Participation Restrictions:   none    Activity limitations:   Learning and applying knowledge  no deficits    General Tasks and Commands  no deficits    Communication  no deficits    Mobility  up and down stairs    Self care  no deficits    Domestic Life  activities involving squatting or stairs    Interactions/Relationships  no deficits    Life Areas  no deficits    Community and Social Life  no deficits         low   Clinical Presentation stable and uncomplicated low   Decision Making/ Complexity Score:      Goals:  SHORT TERM GOALS  1.  Patient to be independent with home exercise program to facilitate carryover between therapy visits.  2.  Patient will increase hamstring length by 7 degrees for improved mobility.    LONG TERM GOALS  1.  Patient will go up/down stairs reciprocal pattern without handrails and no pain in left knee.  2.  Patient will increase hamstring length by 15 degrees for improved mobility.  3.  Patient will have 5-10 degrees active ankle dorsiflexion for improved mobility.  4.  Patient will be able to squat for functional activities without pain in left knee.    Plan   Plan of care Certification: 2/15/2022 to 3/18/2022.    Outpatient Physical Therapy 2 times weekly for 4 weeks to include the following interventions: Electrical Stimulation NMES, Gait Training, Iontophoresis (with Dexamethasone), Manual Therapy, Moist Heat/ Ice, Neuromuscular Re-ed, Patient Education, Therapeutic Activities, Therapeutic Exercise and Ultrasound.     BRANDON JIMENEZ, PT

## 2022-02-18 ENCOUNTER — TELEPHONE (OUTPATIENT)
Dept: UROLOGY | Facility: CLINIC | Age: 72
End: 2022-02-18
Payer: COMMERCIAL

## 2022-02-18 ENCOUNTER — PATIENT MESSAGE (OUTPATIENT)
Dept: UROLOGY | Facility: CLINIC | Age: 72
End: 2022-02-18
Payer: COMMERCIAL

## 2022-02-18 DIAGNOSIS — E29.1 TESTOSTERONE DEFICIENCY IN MALE: Primary | ICD-10-CM

## 2022-02-18 NOTE — TELEPHONE ENCOUNTER
Mr. Felipe had his lab work done and is satisfactory with testosterone of 405.  He needs renewal of his testosterone.  [facility] 10[facility]  Prescription left with pharmacist Kandice.

## 2022-02-23 ENCOUNTER — CLINICAL SUPPORT (OUTPATIENT)
Dept: REHABILITATION | Facility: HOSPITAL | Age: 72
End: 2022-02-23
Attending: ORTHOPAEDIC SURGERY
Payer: COMMERCIAL

## 2022-02-23 DIAGNOSIS — M25.562 ACUTE PAIN OF LEFT KNEE: ICD-10-CM

## 2022-02-23 DIAGNOSIS — M76.52 PATELLAR TENDINITIS OF LEFT KNEE: ICD-10-CM

## 2022-02-23 DIAGNOSIS — M62.9 HAMSTRING TIGHTNESS OF BOTH LOWER EXTREMITIES: Primary | ICD-10-CM

## 2022-02-23 PROCEDURE — 97112 NEUROMUSCULAR REEDUCATION: CPT | Mod: CQ

## 2022-02-23 PROCEDURE — 97110 THERAPEUTIC EXERCISES: CPT | Mod: CQ

## 2022-02-23 PROCEDURE — 97140 MANUAL THERAPY 1/> REGIONS: CPT | Mod: CQ

## 2022-02-23 NOTE — PROGRESS NOTES
[facility]   Physical Therapy Treatment Note     Name: Alon Felipe  Clinic Number: [id number]    Therapy Diagnosis:   Encounter Diagnoses   Name Primary?    Hamstring tightness of both lower extremities Yes    Patellar tendinitis of left knee     Acute pain of left knee      Physician: Alon Acevedo MD    Visit Date: 2/23/2022    Physician Orders: PT Eval and Treat   Medical Diagnosis from Referral: left knee pain / patellar tendonitis  Evaluation Date: 2/15/2022  Authorization Period Expiration: 12/31/2022  Plan of Care Expiration: 3/18/2022  Progress Note Due: n/a  Visit # / Visits authorized: 2/ 20      PTA Visit #: 2/5     Time In: 7:50 am  Time Out: 8:28 am  Total Billable Time: 38 minutes    SUBJECTIVE     Pt reports: he thinks the stretching is helping a little. He does not have a lot of pain at rest- he hurts with bending his knee or stairs. He needs to leave right at 8:30 to make it on time for a meeting.  He was compliant with home exercise program.  Response to previous treatment: no complaint    Functional change: n/a    Pain: 1/10  Location: left knee      OBJECTIVE     Objective Measures updated at progress report unless specified.     Treatment     Case conference with Elke Valente PT, for initial PTA visit.     Chaka received the treatments listed below:      therapeutic exercises to develop strength and flexibility for 15 minutes including:  Bike x 5 minutes   Slant board 3 x 30 second hold bilateral    Hamstring stretch at stairs 2 x 30 second hold bilateral   Chair quad stretch 2 x 30 second hold bilateral     manual therapy techniques: Myofacial release and Soft tissue Mobilization were applied to bilateral lower extremities for 18 minutes, including:  Hamstring, piriformis, JAVON, quad, external rotation, and hip flexor stretches    neuromuscular re-education activities to improve: Coordination and Kinesthetic Sense for 5 minutes. The following activities were  included:  Straight leg raise 2 x 10 bilateral with cues      Patient Education and Home Exercises     Home Exercises Provided and Patient Education Provided     Education provided:   - review of home exercise program     Written Home Exercises Provided: Patient instructed to cont prior HEP and was given quad stretching to add to it. Exercises were reviewed and Chaka was able to demonstrate them prior to the end of the session.  Chaka demonstrated good  understanding of the education provided. See EMR under Patient Instructions for exercises provided during therapy sessions    ASSESSMENT     Alon is a 72 y.o. male referred to outpatient Physical Therapy with a medical diagnosis of left knee pain. He was very tight with all planes of stretching. He performed straight leg raise with some initial cues to focus on quad and keep knee straight without lifting too high.  Chaka Is progressing well towards his goals.   Pt prognosis is Good.     Pt will continue to benefit from skilled outpatient physical therapy to address the deficits listed in the problem list box on initial evaluation, provide pt/family education and to maximize pt's level of independence in the home and community environment.     Pt's spiritual, cultural and educational needs considered and pt agreeable to plan of care and goals.     Anticipated barriers to physical therapy: none    Goals:   SHORT TERM GOALS  1.  Patient to be independent with home exercise program to facilitate carryover between therapy visits.  2.  Patient will increase hamstring length by 7 degrees for improved mobility.     LONG TERM GOALS  1.  Patient will go up/down stairs reciprocal pattern without handrails and no pain in left knee.  2.  Patient will increase hamstring length by 15 degrees for improved mobility.  3.  Patient will have 5-10 degrees active ankle dorsiflexion for improved mobility.  4.  Patient will be able to squat for functional activities without pain in left  knee.      PLAN     Continue Plan of Care to increase flexibility and strength to decrease pain and improve function.    Luann Ladd, PTA

## 2022-02-24 ENCOUNTER — CLINICAL SUPPORT (OUTPATIENT)
Dept: REHABILITATION | Facility: HOSPITAL | Age: 72
End: 2022-02-24
Attending: ORTHOPAEDIC SURGERY
Payer: COMMERCIAL

## 2022-02-24 DIAGNOSIS — M62.9 HAMSTRING TIGHTNESS OF BOTH LOWER EXTREMITIES: Primary | ICD-10-CM

## 2022-02-24 DIAGNOSIS — M76.52 PATELLAR TENDINITIS OF LEFT KNEE: ICD-10-CM

## 2022-02-24 DIAGNOSIS — M25.562 ACUTE PAIN OF LEFT KNEE: ICD-10-CM

## 2022-02-24 PROCEDURE — 97140 MANUAL THERAPY 1/> REGIONS: CPT

## 2022-02-24 PROCEDURE — 97110 THERAPEUTIC EXERCISES: CPT

## 2022-02-24 PROCEDURE — 97112 NEUROMUSCULAR REEDUCATION: CPT

## 2022-02-24 NOTE — PROGRESS NOTES
[facility]   Physical Therapy Treatment Note     Name: Alon Feilpe  Clinic Number: [id number]    Therapy Diagnosis:   Encounter Diagnoses   Name Primary?    Hamstring tightness of both lower extremities Yes    Patellar tendinitis of left knee     Acute pain of left knee      Physician: Alon Acevedo MD    Visit Date: 2/24/2022    Physician Orders: PT Eval and Treat   Medical Diagnosis from Referral: left knee pain / patellar tendonitis  Evaluation Date: 2/15/2022  Authorization Period Expiration: 12/31/2022  Plan of Care Expiration: 3/18/2022  Progress Note Due: n/a  Visit # / Visits authorized: 3/20      PTA Visit #: n/a    Time In: 828 am  Time Out: 917 am  Total Billable Time: 42 minutes    SUBJECTIVE     Pt reports: he is not feeling great today but needs to therapy to get this knee right   He was compliant with home exercise program.  Response to previous treatment: no complaint    Functional change: n/a    Pain: 1/10  Location: left knee      OBJECTIVE     Objective Measures updated at progress report unless specified.     Treatment     Chaka received the treatments listed below:      therapeutic exercises to develop strength and flexibility for 28 minutes including:  Bike x 5 minutes   Slant board stretch x 2 minutes    Hamstring stretch at stairs 3 x 30 second hold bilateral   Chair quad stretch --   Seated hamstring curls 7 plates 3 x 10  Bilateral leg press 6 plates 3 x 10  Left leg press 4 plates 2 x 10    manual therapy techniques: Myofacial release and Soft tissue Mobilization were applied to bilateral lower extremities for 8 minutes, including:  Hamstring, hip external rotation, prone quad and cross friction to patellar tendon - left    neuromuscular re-education activities to improve: Coordination and Kinesthetic Sense for 6 minutes. The following activities were included:  Straight leg raise 3 x 10 bilateral with cues  Terminal knee extension 5 second hold x  20      Patient Education and Home Exercises     Home Exercises Provided and Patient Education Provided     Education provided:   - use Voltaren cream patient has at home on patellar tendon for pain relief    Written Home Exercises Provided: Patient instructed to cont prior HEP and was given quad stretching to add to it. Exercises were reviewed and Chaka was able to demonstrate them prior to the end of the session.  Chaka demonstrated good  understanding of the education provided. See EMR under Patient Instructions for exercises provided during therapy sessions    ASSESSMENT     Alon was able to add seated hamstring curls and leg press today without complaint. He remains very tight in his hamstrings and hips in general. We are focusing on a lot of stretching in addition to strengthening.    Chaka Is progressing well towards his goals.   Pt prognosis is Good.     Pt will continue to benefit from skilled outpatient physical therapy to address the deficits listed in the problem list box on initial evaluation, provide pt/family education and to maximize pt's level of independence in the home and community environment.     Pt's spiritual, cultural and educational needs considered and pt agreeable to plan of care and goals.     Anticipated barriers to physical therapy: none    Goals:   SHORT TERM GOALS  1.  Patient to be independent with home exercise program to facilitate carryover between therapy visits.  2.  Patient will increase hamstring length by 7 degrees for improved mobility.     LONG TERM GOALS  1.  Patient will go up/down stairs reciprocal pattern without handrails and no pain in left knee.  2.  Patient will increase hamstring length by 15 degrees for improved mobility.  3.  Patient will have 5-10 degrees active ankle dorsiflexion for improved mobility.  4.  Patient will be able to squat for functional activities without pain in left knee.      PLAN     Continue Plan of Care to increase flexibility and strength to  decrease pain and improve function.    BRANDON JIMENEZ, PT

## 2022-03-02 ENCOUNTER — CLINICAL SUPPORT (OUTPATIENT)
Dept: REHABILITATION | Facility: HOSPITAL | Age: 72
End: 2022-03-02
Attending: ORTHOPAEDIC SURGERY
Payer: COMMERCIAL

## 2022-03-02 DIAGNOSIS — M62.9 HAMSTRING TIGHTNESS OF BOTH LOWER EXTREMITIES: Primary | ICD-10-CM

## 2022-03-02 DIAGNOSIS — M76.52 PATELLAR TENDINITIS OF LEFT KNEE: ICD-10-CM

## 2022-03-02 DIAGNOSIS — M25.562 ACUTE PAIN OF LEFT KNEE: ICD-10-CM

## 2022-03-02 PROCEDURE — 97140 MANUAL THERAPY 1/> REGIONS: CPT

## 2022-03-02 PROCEDURE — 97112 NEUROMUSCULAR REEDUCATION: CPT

## 2022-03-02 PROCEDURE — 97110 THERAPEUTIC EXERCISES: CPT

## 2022-03-02 NOTE — PROGRESS NOTES
[facility]   Physical Therapy Treatment Note     Name: Alon Felipe  Clinic Number: [id number]    Therapy Diagnosis:   Encounter Diagnoses   Name Primary?    Hamstring tightness of both lower extremities Yes    Patellar tendinitis of left knee     Acute pain of left knee      Physician: Alon Acevedo MD    Visit Date: 3/2/2022    Physician Orders: PT Eval and Treat   Medical Diagnosis from Referral: left knee pain / patellar tendonitis  Evaluation Date: 2/15/2022  Authorization Period Expiration: 12/31/2022  Plan of Care Expiration: 3/18/2022  Progress Note Due: n/a  Visit # / Visits authorized: 4/20      PTA Visit #: n/a    Time In: 743 am  Time Out: 833 am  Total Billable Time: 41 minutes    SUBJECTIVE     Pt reports: his knee is feeling better - says he was able to walk up his stairs at home and didn't feel any discomfort until the last couple of steps at the top  He was compliant with home exercise program.  Response to previous treatment: no complaint    Functional change: n/a    Pain: 1/10  Location: left knee      OBJECTIVE     Objective Measures updated at progress report unless specified.     Treatment     Chaka received the treatments listed below:      therapeutic exercises to develop strength and flexibility for 24 minutes including:  Bike x 5 minutes   Slant board stretch x 2 minutes    Hamstring stretch at stairs 3 x 30 second hold bilateral   Chair quad stretch --   Seated hamstring curls 7 plates 3 x 10  Bilateral leg press 6 plates 3 x 10  Left leg press 4 plates 2 x 10    manual therapy techniques: Myofacial release and Soft tissue Mobilization were applied to bilateral lower extremities for 10 minutes, including:  Hamstring, hip external rotation, prone quad and ITB stretching; IASTM to patellar tendon - left    neuromuscular re-education activities to improve: Coordination and Kinesthetic Sense for 7 minutes. The following activities were included:  Straight leg raise  3 x 10 bilateral with cues  Terminal knee extension 5 second hold x 20      Patient Education and Home Exercises     Home Exercises Provided and Patient Education Provided     Education provided:   - use Voltaren cream patient has at home on patellar tendon for pain relief    Written Home Exercises Provided: Patient instructed to cont prior HEP and was given quad stretching to add to it. Exercises were reviewed and Chaka was able to demonstrate them prior to the end of the session.  Chaka demonstrated good  understanding of the education provided. See EMR under Patient Instructions for exercises provided during therapy sessions    ASSESSMENT     Alon voiced a decrease in knee pain with his stairs at home. Utilized IASTM to left patellar tendon today. Will continue working on flexibility, strength and quad control of left knee.    Chaka Is progressing well towards his goals.   Pt prognosis is Good.     Pt will continue to benefit from skilled outpatient physical therapy to address the deficits listed in the problem list box on initial evaluation, provide pt/family education and to maximize pt's level of independence in the home and community environment.     Pt's spiritual, cultural and educational needs considered and pt agreeable to plan of care and goals.     Anticipated barriers to physical therapy: none    Goals:   SHORT TERM GOALS  1.  Patient to be independent with home exercise program to facilitate carryover between therapy visits.  2.  Patient will increase hamstring length by 7 degrees for improved mobility.     LONG TERM GOALS  1.  Patient will go up/down stairs reciprocal pattern without handrails and no pain in left knee.  2.  Patient will increase hamstring length by 15 degrees for improved mobility.  3.  Patient will have 5-10 degrees active ankle dorsiflexion for improved mobility.  4.  Patient will be able to squat for functional activities without pain in left knee.      PLAN     Continue Plan of Care  to increase flexibility and strength to decrease pain and improve function.    BRANDON JIMENEZ, PT

## 2022-07-08 DIAGNOSIS — N40.1 BPH WITH OBSTRUCTION/LOWER URINARY TRACT SYMPTOMS: ICD-10-CM

## 2022-07-08 DIAGNOSIS — E29.1 TESTICULAR HYPOFUNCTION: Primary | ICD-10-CM

## 2022-07-08 DIAGNOSIS — N13.8 BPH WITH OBSTRUCTION/LOWER URINARY TRACT SYMPTOMS: ICD-10-CM

## 2022-07-08 RX ORDER — DUTASTERIDE 0.5 MG/1
0.5 CAPSULE, LIQUID FILLED ORAL DAILY
Qty: 30 CAPSULE | Refills: 11 | Status: SHIPPED | OUTPATIENT
Start: 2022-07-08 | End: 2022-08-09 | Stop reason: ALTCHOICE

## 2022-07-08 NOTE — TELEPHONE ENCOUNTER
Patient's wife notified us he needs 30 day refill on dutasteride sent in to Mr. Wilburn in [place]. Last seen 7/28/21, no future appointment scheduled. One year visit made with testosterone and H&H per Dr. Zepeda's last note; wife notified and agreed.

## 2022-08-02 ENCOUNTER — OFFICE VISIT (OUTPATIENT)
Dept: INTERNAL MEDICINE | Facility: CLINIC | Age: 72
End: 2022-08-02
Payer: COMMERCIAL

## 2022-08-02 VITALS
HEIGHT: 71 IN | RESPIRATION RATE: 20 BRPM | HEART RATE: 62 BPM | BODY MASS INDEX: 29.4 KG/M2 | WEIGHT: 210 LBS | SYSTOLIC BLOOD PRESSURE: 142 MMHG | OXYGEN SATURATION: 99 % | DIASTOLIC BLOOD PRESSURE: 80 MMHG

## 2022-08-02 DIAGNOSIS — R05.9 COUGH: ICD-10-CM

## 2022-08-02 DIAGNOSIS — E78.5 DYSLIPIDEMIA: ICD-10-CM

## 2022-08-02 DIAGNOSIS — I10 ESSENTIAL HYPERTENSION: ICD-10-CM

## 2022-08-02 DIAGNOSIS — Z09 FOLLOW UP: Primary | ICD-10-CM

## 2022-08-02 DIAGNOSIS — M54.50 ACUTE LEFT-SIDED LOW BACK PAIN, UNSPECIFIED WHETHER SCIATICA PRESENT: ICD-10-CM

## 2022-08-02 DIAGNOSIS — J32.9 CHRONIC SINUSITIS, UNSPECIFIED LOCATION: ICD-10-CM

## 2022-08-02 DIAGNOSIS — N40.0 BENIGN PROSTATIC HYPERPLASIA, UNSPECIFIED WHETHER LOWER URINARY TRACT SYMPTOMS PRESENT: ICD-10-CM

## 2022-08-02 PROCEDURE — 3288F PR FALLS RISK ASSESSMENT DOCUMENTED: ICD-10-PCS | Mod: ICN,,, | Performed by: INTERNAL MEDICINE

## 2022-08-02 PROCEDURE — 3008F BODY MASS INDEX DOCD: CPT | Mod: ICN,,, | Performed by: INTERNAL MEDICINE

## 2022-08-02 PROCEDURE — 4010F ACE/ARB THERAPY RXD/TAKEN: CPT | Mod: ICN,,, | Performed by: INTERNAL MEDICINE

## 2022-08-02 PROCEDURE — 96372 THER/PROPH/DIAG INJ SC/IM: CPT | Mod: PBBFAC | Performed by: INTERNAL MEDICINE

## 2022-08-02 PROCEDURE — 3288F FALL RISK ASSESSMENT DOCD: CPT | Mod: ICN,,, | Performed by: INTERNAL MEDICINE

## 2022-08-02 PROCEDURE — 99215 OFFICE O/P EST HI 40 MIN: CPT | Mod: PBBFAC | Performed by: INTERNAL MEDICINE

## 2022-08-02 PROCEDURE — 3008F PR BODY MASS INDEX (BMI) DOCUMENTED: ICD-10-PCS | Mod: ICN,,, | Performed by: INTERNAL MEDICINE

## 2022-08-02 PROCEDURE — 3079F PR MOST RECENT DIASTOLIC BLOOD PRESSURE 80-89 MM HG: ICD-10-PCS | Mod: ICN,,, | Performed by: INTERNAL MEDICINE

## 2022-08-02 PROCEDURE — 1159F MED LIST DOCD IN RCRD: CPT | Mod: ICN,,, | Performed by: INTERNAL MEDICINE

## 2022-08-02 PROCEDURE — 1101F PR PT FALLS ASSESS DOC 0-1 FALLS W/OUT INJ PAST YR: ICD-10-PCS | Mod: ICN,,, | Performed by: INTERNAL MEDICINE

## 2022-08-02 PROCEDURE — 3079F DIAST BP 80-89 MM HG: CPT | Mod: ICN,,, | Performed by: INTERNAL MEDICINE

## 2022-08-02 PROCEDURE — 4010F PR ACE/ARB THEARPY RXD/TAKEN: ICD-10-PCS | Mod: ICN,,, | Performed by: INTERNAL MEDICINE

## 2022-08-02 PROCEDURE — 1159F PR MEDICATION LIST DOCUMENTED IN MEDICAL RECORD: ICD-10-PCS | Mod: ICN,,, | Performed by: INTERNAL MEDICINE

## 2022-08-02 PROCEDURE — 1125F PR PAIN SEVERITY QUANTIFIED, PAIN PRESENT: ICD-10-PCS | Mod: ICN,,, | Performed by: INTERNAL MEDICINE

## 2022-08-02 PROCEDURE — 99214 OFFICE O/P EST MOD 30 MIN: CPT | Mod: S$PBB,ICN,, | Performed by: INTERNAL MEDICINE

## 2022-08-02 PROCEDURE — 3077F SYST BP >= 140 MM HG: CPT | Mod: ICN,,, | Performed by: INTERNAL MEDICINE

## 2022-08-02 PROCEDURE — 3077F PR MOST RECENT SYSTOLIC BLOOD PRESSURE >= 140 MM HG: ICD-10-PCS | Mod: ICN,,, | Performed by: INTERNAL MEDICINE

## 2022-08-02 PROCEDURE — 99214 PR OFFICE/OUTPT VISIT, EST, LEVL IV, 30-39 MIN: ICD-10-PCS | Mod: S$PBB,ICN,, | Performed by: INTERNAL MEDICINE

## 2022-08-02 PROCEDURE — 1125F AMNT PAIN NOTED PAIN PRSNT: CPT | Mod: ICN,,, | Performed by: INTERNAL MEDICINE

## 2022-08-02 PROCEDURE — 1101F PT FALLS ASSESS-DOCD LE1/YR: CPT | Mod: ICN,,, | Performed by: INTERNAL MEDICINE

## 2022-08-02 RX ORDER — KETOROLAC TROMETHAMINE 30 MG/ML
60 INJECTION, SOLUTION INTRAMUSCULAR; INTRAVENOUS ONCE
Status: COMPLETED | OUTPATIENT
Start: 2022-08-02 | End: 2022-08-02

## 2022-08-02 RX ORDER — FLUTICASONE PROPIONATE 50 MCG
1 SPRAY, SUSPENSION (ML) NASAL DAILY
Qty: 16 G | Refills: 1 | Status: SHIPPED | OUTPATIENT
Start: 2022-08-02

## 2022-08-02 RX ORDER — METHYLPREDNISOLONE SODIUM SUCCINATE 40 MG/ML
40 INJECTION INTRAMUSCULAR; INTRAVENOUS
Status: COMPLETED | OUTPATIENT
Start: 2022-08-02 | End: 2022-08-02

## 2022-08-02 RX ORDER — BENZONATATE 200 MG/1
200 CAPSULE ORAL 3 TIMES DAILY PRN
Qty: 30 CAPSULE | Refills: 1 | Status: SHIPPED | OUTPATIENT
Start: 2022-08-02 | End: 2022-08-12

## 2022-08-02 RX ORDER — OLMESARTAN MEDOXOMIL 20 MG/1
20 TABLET ORAL DAILY
Qty: 90 TABLET | Refills: 3 | Status: SHIPPED | OUTPATIENT
Start: 2022-08-02 | End: 2023-08-02

## 2022-08-02 RX ORDER — PREDNISONE 20 MG/1
20 TABLET ORAL DAILY
Qty: 7 TABLET | Refills: 1 | Status: SHIPPED | OUTPATIENT
Start: 2022-08-02

## 2022-08-02 RX ADMIN — METHYLPREDNISOLONE 40 MG: 40 INJECTION, POWDER, LYOPHILIZED, FOR SOLUTION INTRAMUSCULAR; INTRAVENOUS at 08:08

## 2022-08-02 RX ADMIN — KETOROLAC TROMETHAMINE 60 MG: 30 INJECTION, SOLUTION INTRAMUSCULAR at 08:08

## 2022-08-02 NOTE — PROGRESS NOTES
Subjective:       Patient ID: Alon Felipe is a 72 y.o. male.    Chief Complaint: Follow-up (6 month f/u  ), Back Pain, Cough (Productive), and Sputum Production (Clear )    The patient is a 71-year-old white male the presents today to establish care.  He has a history of hypertension, chronic low back issues, and BPH.  He has just recently completed treatment for community-acquired pneumonia.  He states that he is feeling much better.  He recently saw Dr. Harrison (pulmonologist).  Health maintenance issues are up-to-date.  He has received both doses of COVID vaccine.  Currently no acute issues or complaints.  He is resting comfortably today in no distress.  He is afebrile and vital signs are stable.    2/3/2022-the patient presents today for follow-up.  Over the last few days at home he has been checking his blood pressure and it has been elevated.  He does have a new blood pressure cough.  He is on lisinopril 20 mg daily.  He also as amlodipine 5 mg that he has been taking nightly.  Blood pressure looks great today.  It is 126/72.  He also complains of some left knee pain when trying to walk up stairs.  No known injury.  He continues to have issues with his GI system.  Immediately or shortly after eating he has to go to the restroom.  He denies constipation or diarrhea.  He denies melena or hematochezia.  Today he is resting comfortably in no distress.  He is currently afebrile and vital signs are stable.    8/2-the patient presents today for follow-up.  He has still been having some issues with cough.  Sometimes dry and sometimes productive.  He does have issues with sinus drainage.  He has tried antihistamines in the past with only limited help.  He states that the Flonase did help some.  He denies significant reflux.  He is on an ACE-inhibitor.  He has seen ENT in the past.  He also complains of some low back pain.  No loss of strength and no numbness or tingling.  [facility]  Today he is resting comfortably in no distress.  He is afebrile and vital signs are stable.    Follow-up  Associated symptoms include arthralgias and coughing. Pertinent negatives include no abdominal pain, change in bowel habit, chest pain, chills, fever, headaches, joint swelling, myalgias, nausea, neck pain, rash, sore throat or weakness.   Back Pain  Pertinent negatives include no abdominal pain, chest pain, dysuria, fever, headaches or weakness.   Cough  Pertinent negatives include no chest pain, chills, ear pain, eye redness, fever, headaches, myalgias, rash, sore throat, shortness of breath, sweats or wheezing.   Hypertension  This is a recurrent problem. The current episode started more than 1 year ago. The problem has been gradually worsening since onset. The problem is resistant. Associated symptoms include blurred vision, malaise/fatigue, orthopnea and PND. Pertinent negatives include no anxiety, chest pain, headaches, neck pain, palpitations, peripheral edema, shortness of breath or sweats. There are no associated agents to hypertension. There are no known risk factors for coronary artery disease. Past treatments include nothing. The current treatment provides moderate improvement. There are no compliance problems.      Review of Systems   Constitutional: Positive for malaise/fatigue. Negative for appetite change, chills and fever.   HENT: Negative for ear pain, hearing loss, sinus pressure/congestion and sore throat.    Eyes: Positive for blurred vision. Negative for pain, redness and visual disturbance.   Respiratory: Positive for cough. Negative for apnea, shortness of breath and wheezing.    Cardiovascular: Positive for orthopnea and PND. Negative for chest pain, palpitations and leg swelling.   Gastrointestinal: Negative for abdominal pain, blood in stool, change in bowel habit, constipation, diarrhea, nausea and change in bowel habit.   Endocrine: Negative for cold intolerance, heat  intolerance and polyuria.   Genitourinary: Negative for dysuria and hematuria.   Musculoskeletal: Positive for arthralgias and back pain. Negative for joint swelling, myalgias and neck pain.   Integumentary:  Negative for pallor and rash.   Allergic/Immunologic: Negative for frequent infections.   Neurological: Negative for tremors, seizures, weakness and headaches.   Hematological: Negative for adenopathy.   Psychiatric/Behavioral: Negative for confusion, dysphoric mood and sleep disturbance. The patient is not nervous/anxious.          Objective:      Physical Exam  Vitals and nursing note reviewed.   Constitutional:       General: He is not in acute distress.     Appearance: Normal appearance. He is not ill-appearing.   HENT:      Head: Normocephalic and atraumatic.      Right Ear: External ear normal.      Left Ear: External ear normal.      Nose: Nose normal.      Mouth/Throat:      Pharynx: Oropharynx is clear.   Eyes:      Extraocular Movements: Extraocular movements intact.      Conjunctiva/sclera: Conjunctivae normal.      Pupils: Pupils are equal, round, and reactive to light.   Neck:      Vascular: No carotid bruit.   Cardiovascular:      Rate and Rhythm: Normal rate and regular rhythm.      Pulses: Normal pulses.      Heart sounds: Normal heart sounds. No murmur heard.  Pulmonary:      Effort: No respiratory distress.      Breath sounds: Normal breath sounds. No wheezing or rales.   Abdominal:      General: Bowel sounds are normal. There is no distension.      Palpations: Abdomen is soft.   Musculoskeletal:         General: Tenderness present. Normal range of motion.      Cervical back: Normal range of motion and neck supple.      Right lower leg: No edema.      Left lower leg: No edema.   Skin:     General: Skin is warm and dry.      Capillary Refill: Capillary refill takes less than 2 seconds.      Coloration: Skin is not pale.   Neurological:      General: No focal deficit present.      Mental Status:  He is alert and oriented to person, place, and time.      Cranial Nerves: No cranial nerve deficit.      Sensory: No sensory deficit.      Motor: No weakness.   Psychiatric:         Mood and Affect: Mood normal.         Judgment: Judgment normal.         Assessment:       1. Follow up    2. Cough    3. Essential hypertension    4. Dyslipidemia    5. Benign prostatic hyperplasia, unspecified whether lower urinary tract symptoms present    6. Acute left-sided low back pain, unspecified whether sciatica present        Plan:       1. Patient is here for follow-up.  [facility]  we have reviewed that lab work as it is been downloaded to his chart.  Age-appropriate health maintenance issues are up-to-date.    2. Essential hypertension-blood pressure looks good today.  He is currently on lisinopril 20 mg daily and amlodipine 5 mg at bedtime.  Blood pressure is 126/72 today.  He brings in blood pressure measurements over the last couple of days and show some elevated blood pressures.  He recently got a new blood pressure cuff.  We are going to have him bring it by the office so we can titrate it to make sure it is working appropriately.  8/2-blood pressure 142/80 today he does have some back pain which is likely contributing to the mild elevation.  We are going to continue with the amlodipine however I am going to switch from lisinopril to olmesartan due to his chronic cough.  We will use olmesartan 20 mg daily    3. BPH-he follows with Dr. Zepeda.  He is currently on finasteride 5 mg daily and Flomax 0.4 mg daily.  Currently no acute issues.  Recent PSA within normal limits    4. Nausea-continues to have some GI issues where her shortly after eating he has to rush to the bathroom.  Still has his gallbladder in place.  He denies any significant reflux recently.  We are going to check a gallbladder ultrasound.  This is normal we will perform a HIDA scan.  8/2-GI issues are much better    5.  Dyslipidemia-continue with current care---recent lipid panel shows an HDL of 57, LDL of 91, total cholesterol of 160    6. Low back pain- happens occasionally.  We are going to give a dose of Toradol today.    7. Chronic cough-felt to be related to sinus drainage.  He recently tried some Mucinex but did not help much.  We are going to try Flonase at bedtime to see if this will help  8/2-still with cough.  Still having some issues with sinus drainage.  Going to give him a steroid injection today and a short course of prednisone outpatient.  Continue with the Flonase.  Make a referral to Dr. Mares in [facility] ([facility]).  We are also going to transition away from ACE-inhibitor is this could also be contributing.    Return to care in 6 months for routine follow-up.  Sooner if needed.      He recently had a lot of lab work performed with his acute illness.  No need to repeat any labs at this time.  We will check a lipid panel at his next visit.  Return to care in 6 months or sooner if needed.

## 2022-08-09 ENCOUNTER — OFFICE VISIT (OUTPATIENT)
Dept: UROLOGY | Facility: CLINIC | Age: 72
End: 2022-08-09
Payer: COMMERCIAL

## 2022-08-09 VITALS
DIASTOLIC BLOOD PRESSURE: 89 MMHG | HEIGHT: 72 IN | BODY MASS INDEX: 28.17 KG/M2 | SYSTOLIC BLOOD PRESSURE: 136 MMHG | HEART RATE: 113 BPM | WEIGHT: 208 LBS

## 2022-08-09 DIAGNOSIS — R53.83 FATIGUE, UNSPECIFIED TYPE: ICD-10-CM

## 2022-08-09 DIAGNOSIS — N40.0 BENIGN PROSTATIC HYPERPLASIA, UNSPECIFIED WHETHER LOWER URINARY TRACT SYMPTOMS PRESENT: Primary | ICD-10-CM

## 2022-08-09 DIAGNOSIS — E29.1 TESTICULAR HYPOGONADISM: ICD-10-CM

## 2022-08-09 PROCEDURE — 1159F PR MEDICATION LIST DOCUMENTED IN MEDICAL RECORD: ICD-10-PCS | Mod: ,,, | Performed by: UROLOGY

## 2022-08-09 PROCEDURE — 1126F PR PAIN SEVERITY QUANTIFIED, NO PAIN PRESENT: ICD-10-PCS | Mod: ,,, | Performed by: UROLOGY

## 2022-08-09 PROCEDURE — 1101F PT FALLS ASSESS-DOCD LE1/YR: CPT | Mod: ,,, | Performed by: UROLOGY

## 2022-08-09 PROCEDURE — 1159F MED LIST DOCD IN RCRD: CPT | Mod: ,,, | Performed by: UROLOGY

## 2022-08-09 PROCEDURE — 4010F ACE/ARB THERAPY RXD/TAKEN: CPT | Mod: ,,, | Performed by: UROLOGY

## 2022-08-09 PROCEDURE — 1126F AMNT PAIN NOTED NONE PRSNT: CPT | Mod: ,,, | Performed by: UROLOGY

## 2022-08-09 PROCEDURE — 4010F PR ACE/ARB THEARPY RXD/TAKEN: ICD-10-PCS | Mod: ,,, | Performed by: UROLOGY

## 2022-08-09 PROCEDURE — 3075F SYST BP GE 130 - 139MM HG: CPT | Mod: ,,, | Performed by: UROLOGY

## 2022-08-09 PROCEDURE — 3288F FALL RISK ASSESSMENT DOCD: CPT | Mod: ,,, | Performed by: UROLOGY

## 2022-08-09 PROCEDURE — 3079F PR MOST RECENT DIASTOLIC BLOOD PRESSURE 80-89 MM HG: ICD-10-PCS | Mod: ,,, | Performed by: UROLOGY

## 2022-08-09 PROCEDURE — 99213 PR OFFICE/OUTPT VISIT, EST, LEVL III, 20-29 MIN: ICD-10-PCS | Mod: S$PBB,,, | Performed by: UROLOGY

## 2022-08-09 PROCEDURE — 3075F PR MOST RECENT SYSTOLIC BLOOD PRESS GE 130-139MM HG: ICD-10-PCS | Mod: ,,, | Performed by: UROLOGY

## 2022-08-09 PROCEDURE — 3008F PR BODY MASS INDEX (BMI) DOCUMENTED: ICD-10-PCS | Mod: ,,, | Performed by: UROLOGY

## 2022-08-09 PROCEDURE — 99214 OFFICE O/P EST MOD 30 MIN: CPT | Mod: PBBFAC | Performed by: UROLOGY

## 2022-08-09 PROCEDURE — 3288F PR FALLS RISK ASSESSMENT DOCUMENTED: ICD-10-PCS | Mod: ,,, | Performed by: UROLOGY

## 2022-08-09 PROCEDURE — 99213 OFFICE O/P EST LOW 20 MIN: CPT | Mod: S$PBB,,, | Performed by: UROLOGY

## 2022-08-09 PROCEDURE — 3008F BODY MASS INDEX DOCD: CPT | Mod: ,,, | Performed by: UROLOGY

## 2022-08-09 PROCEDURE — 3079F DIAST BP 80-89 MM HG: CPT | Mod: ,,, | Performed by: UROLOGY

## 2022-08-09 PROCEDURE — 1101F PR PT FALLS ASSESS DOC 0-1 FALLS W/OUT INJ PAST YR: ICD-10-PCS | Mod: ,,, | Performed by: UROLOGY

## 2022-08-09 RX ORDER — FINASTERIDE 5 MG/1
5 TABLET, FILM COATED ORAL DAILY
Qty: 90 TABLET | Refills: 3 | Status: SHIPPED | OUTPATIENT
Start: 2022-08-09 | End: 2023-09-18 | Stop reason: ALTCHOICE

## 2022-08-09 RX ORDER — TAMSULOSIN HYDROCHLORIDE 0.4 MG/1
CAPSULE ORAL
Qty: 90 CAPSULE | Refills: 3 | Status: SHIPPED | OUTPATIENT
Start: 2022-08-09

## 2022-08-11 NOTE — PATIENT INSTRUCTIONS
We are trying to get patient approved for Aveed.  He will get his 1st injection, the 2nd injection in 4 weeks, the 3rd injection 10 weeks later, and serum testosterone 1 month after that 3rd injection.  He will then get Aveed injection every 10 weeks assuming he gets to therapeutic level with his testosterone.    Renewed tamsulosin and finasteride.  One year appointment or sooner if needed.

## 2022-08-11 NOTE — PROGRESS NOTES
Subjective:       Patient ID: Alon Felipe is a 72 y.o. male.    Chief Complaint: Follow-up (One year visit, Testosterone, H&H)       Urology History  Symptoms:  No dysuria, No urethral discharge, No hematuria, No erectile dysfunction  Associated Symptoms:  No abdominal pain, No edema  History of Present Illness  This 64-year-old white male still works with [employer]. He has a history as noted in the dictation of 1/16/2012 when I last saw him. He is seen about every 2 years. He is on Flomax and Uroxatral as well as Cialis. We had to encourage him to return for followup visit.      He has not had any flare-ups with his chronic bacterial prostatitis. The patient still voids adequately then he [facility]. He takes super beta prostate in addition to the Uroxatral and Avodart.      I talked with him about Cialis. He normally takes a 20 mg dose. He is able to penetrate but he had poor quality erections. I suggested that he switch to the 5 mg dose and try two 5s for total of 10 mg, etc. I gave him a sample box and a coupon. By rectal examination, he has a 20 to 30 grams size prostate that feels benign. He understands that I plan to retire.  (December 9, 2014)  ------------------------------------------------------------------------------------------------------------------------------------------------------------  The above note is the last clinic note of Dr. Dani De La Vega     Mr. Felipe is 68 years old. He is the  of Ms. Jami Felipe. Patient been on combination therapy for bladder outlet obstruction from BPH for several years with Avodart and tamsulosin. He used to be on 5 mg Cialis but is no longer on that. He is having nocturia generally no more than one time nightly. [facility] 0.76. Patient has been on testosterone injections for several years but did not get really good results from that because of the peaks and valleys it produced from only taking  once a month. Not on anything for testicular hypogonadism at present. Voiding okay currently; has nocturia one time nightly but IPSS score is 19. (June 18, 2018)     Mr. Felipe is in for the first time in nearly one year. He had been using injectable testosterone but we switched him to testosterone cream on April 30 and he's been on 5% testosterone since. He does not think he has as much energy as he had before, but doing fairly well. He has some urinary frequency in the morning after he drinks his coffee but no nocturia. No worsening bladder outlet obstructive symptoms. Generally feeling okay. (Jerica 3, 2019)     Mr. Felipe has been doing reasonably well although his energy level is not as good as when he took testosterone shots. That is not surprising. Testosterone is pending as he had his level drawn this morning. No worsening bladder outlet obstruction. He has frequency worse in the morning after he drinks coffee but does not have nocturia but one time nightly typically. Overall satisfactory and doing okay. Desires no additional help with voiding. Serum testosterone level pending.  (Jerica 15, 2020)     PSA Results:  Past PSA Results   PSA was 0.81 on April 30, 2020  PSA was 0.76 on March 3, 2017  PSA was 0.851 on January 16, 2012  PSA was 0.706 on February 28, 2007  PSA was 1.74 on January 30, 2006  --------------------------------------------------------------------------------------------------     PSA was 0.53 on 3/18/2021  PSA was 0.425 on February 18, 2022     Mr. Felipe is in for his 1 year follow-up.  He has testicular hypogonadism and is on 9% testosterone.  Recent testosterone is barely in therapeutic range despite using his testosterone cream on a regular basis as directed.  Testosterone is lower than last year.  We can go up on concentration again.  He had a PSA in March that was 0.53.  [facility]  Had recent respiratory illness but has recovered from  that.  Energy level not as good as desired but he is quite active.   (July 28, 2021)     Mr. Felipe is in for 1st visit in 1 year.  He has continued on 10% testosterone but wants to see if he can be switched to Aveed.  He would find that to be more convenient if we can get him approved.  Needs renewal of finasteride and tamsulosin.  Voiding is stable and he does not feel he needs any extra help with voiding.  He had his PSA done and it is normal at 0.52.  We will delay getting a serum testosterone until after he starts the Aveed and get it done after the 1st couple of injections. (August 9, 2022)    Review of Systems      Objective:      Physical Exam  Constitutional:       Appearance: Normal appearance. He is normal weight.   Genitourinary:     Comments: Prostate is relatively small firm and symmetrical  Neurological:      Mental Status: He is alert.   Psychiatric:         Mood and Affect: Mood normal.         Behavior: Behavior normal.       urinalysis unremarkable with occasional pus cells.  The dipstick negative pH 5.0 and specific gravity 1.010  Assessment:       Problem List Items Addressed This Visit        Renal/    Benign prostatic hyperplasia - Primary    Relevant Medications    tamsulosin (FLOMAX) 0.4 mg Cap      Other Visit Diagnoses     Testicular hypogonadism        Fatigue, unspecified type              Plan:     We are trying to get patient approved for Aveed.  He will get his 1st injection, the 2nd injection in 4 weeks, the 3rd injection 10 weeks later, and serum testosterone 1 month after that 3rd injection.  He will then get Aveed injection every 10 weeks assuming he gets to therapeutic level with his testosterone.    Renewed tamsulosin and finasteride.  One year appointment or sooner if needed.  *

## 2022-08-18 ENCOUNTER — TELEPHONE (OUTPATIENT)
Dept: PULMONOLOGY | Facility: CLINIC | Age: 72
End: 2022-08-18
Payer: COMMERCIAL

## 2022-08-18 DIAGNOSIS — Z87.19 H/O GASTROESOPHAGEAL REFLUX (GERD): Primary | ICD-10-CM

## 2022-08-18 DIAGNOSIS — R19.8 ABNORMAL FINDINGS ON ESOPHAGOGASTRODUODENOSCOPY (EGD): Primary | ICD-10-CM

## 2022-08-25 DIAGNOSIS — R53.83 FATIGUE, UNSPECIFIED TYPE: ICD-10-CM

## 2022-08-25 DIAGNOSIS — E29.1 TESTICULAR HYPOGONADISM: Primary | ICD-10-CM

## 2022-09-01 ENCOUNTER — HOSPITAL ENCOUNTER (EMERGENCY)
Facility: HOSPITAL | Age: 72
Discharge: HOME | End: 2022-09-01
Attending: EMERGENCY MEDICINE
Payer: COMMERCIAL

## 2022-09-01 VITALS
DIASTOLIC BLOOD PRESSURE: 76 MMHG | OXYGEN SATURATION: 97 % | HEART RATE: 89 BPM | HEIGHT: 72 IN | RESPIRATION RATE: 18 BRPM | BODY MASS INDEX: 27.22 KG/M2 | TEMPERATURE: 98 F | SYSTOLIC BLOOD PRESSURE: 129 MMHG | WEIGHT: 201 LBS

## 2022-09-01 DIAGNOSIS — S61.217A LACERATION OF LEFT LITTLE FINGER WITHOUT FOREIGN BODY WITHOUT DAMAGE TO NAIL, INITIAL ENCOUNTER: Primary | ICD-10-CM

## 2022-09-01 PROCEDURE — 12002 PR RESUP NPTERF WND BODY 2.6-7.5 CM: ICD-10-PCS | Mod: ,,, | Performed by: EMERGENCY MEDICINE

## 2022-09-01 PROCEDURE — 99282 EMERGENCY DEPT VISIT SF MDM: CPT

## 2022-09-01 PROCEDURE — 99282 EMERGENCY DEPT VISIT SF MDM: CPT | Mod: 25,,, | Performed by: EMERGENCY MEDICINE

## 2022-09-01 PROCEDURE — 99282 PR EMERGENCY DEPT VISIT,LEVEL II: ICD-10-PCS | Mod: 25,,, | Performed by: EMERGENCY MEDICINE

## 2022-09-01 PROCEDURE — 25000003 PHARM REV CODE 250: Performed by: EMERGENCY MEDICINE

## 2022-09-01 PROCEDURE — 12002 RPR S/N/AX/GEN/TRNK2.6-7.5CM: CPT

## 2022-09-01 PROCEDURE — 12002 RPR S/N/AX/GEN/TRNK2.6-7.5CM: CPT | Mod: ,,, | Performed by: EMERGENCY MEDICINE

## 2022-09-01 RX ORDER — LIDOCAINE HYDROCHLORIDE 10 MG/ML
10 INJECTION, SOLUTION EPIDURAL; INFILTRATION; INTRACAUDAL; PERINEURAL
Status: COMPLETED | OUTPATIENT
Start: 2022-09-01 | End: 2022-09-01

## 2022-09-01 RX ADMIN — LIDOCAINE HYDROCHLORIDE 100 MG: 10 INJECTION, SOLUTION EPIDURAL; INFILTRATION; INTRACAUDAL; PERINEURAL at 08:09

## 2022-09-02 NOTE — ED PROVIDER NOTES
Encounter Date: 9/1/2022    SCRIBE #1 NOTE: I, Maureen Kinsey, am scribing for, and in the presence of,  Bora Montero MD. I have scribed the entire note.     History     Chief Complaint   Patient presents with    Laceration     Laceration to right hand 5th digit - approx 1 hour pta pt states a silver hook connected to a rope used to play with his dog     This is a 71 y/o white male,who presents to the ED with complaints of a laceration. He states one hour ago, he cut his right 5th digit on a hook connected to a rope. He is able to bend his fingers and make a fist. There are no other injuries noted at this time.    The history is provided by the patient. No  was used.   Review of patient's allergies indicates:   Allergen Reactions    Iodine and iodide containing products Itching and Swelling     Past Medical History:   Diagnosis Date    Enlarged prostate     Hypertension     Stroke      Past Surgical History:   Procedure Laterality Date    BACK SURGERY       Family History   Problem Relation Age of Onset    Heart failure Mother     Cancer Father      Social History     Tobacco Use    Smoking status: Never    Smokeless tobacco: Never   Substance Use Topics    Alcohol use: Not Currently    Drug use: Never     Review of Systems   Skin:         Laceration to the right 5th digit.    All other systems reviewed and are negative.    Physical Exam     Initial Vitals [09/01/22 2015]   BP Pulse Resp Temp SpO2   129/76 89 18 98.1 °F (36.7 °C) 97 %      MAP       --         Physical Exam    Nursing note and vitals reviewed.  Constitutional: He appears well-developed and well-nourished.   HENT:   Head: Normocephalic and atraumatic.   Eyes: Conjunctivae and EOM are normal. Pupils are equal, round, and reactive to light.   Neck: Neck supple.   Normal range of motion.  Cardiovascular:  Normal rate, regular rhythm, normal heart sounds and intact distal pulses.           Pulmonary/Chest: Breath sounds normal.    Abdominal: Abdomen is soft. Bowel sounds are normal.   Musculoskeletal:         General: Normal range of motion.      Cervical back: Normal range of motion and neck supple.     Neurological: He is alert and oriented to person, place, and time. He has normal strength.   Skin: Skin is warm and dry. Capillary refill takes less than 2 seconds.   Psychiatric: He has a normal mood and affect. Thought content normal.       ED Course   Lac Repair    Date/Time: 9/1/2022 8:39 PM  Performed by: Bora Montero MD  Authorized by: Bora Montero MD     Consent:     Consent obtained:  Verbal and written    Consent given by:  Patient  Universal protocol:     Patient identity confirmed:  Verbally with patient, arm band and provided demographic data  Anesthesia:     Anesthesia method:  Local infiltration    Local anesthetic:  Lidocaine 1% WITH epi  Laceration details:     Location:  Finger    Finger location:  R small finger    Length (cm):  3.5  Pre-procedure details:     Preparation:  Patient was prepped and draped in usual sterile fashion  Treatment:     Area cleansed with:  Povidone-iodine    Irrigation solution:  Sterile water    Irrigation method:  Syringe    Debridement:  None  Skin repair:     Repair method:  Sutures    Suture size:  4-0    Wound skin closure material used: Ethilon.  Approximation:     Approximation:  Close  Repair type:     Repair type:  Simple  Labs Reviewed - No data to display       Imaging Results    None          Medications   LIDOcaine (PF) 10 mg/ml (1%) injection 100 mg (100 mg Intradermal Given 9/1/22 2049)                Attending Attestation:           Physician Attestation for Scribe:  Physician Attestation Statement for Scribe #1: I, Bora Montero MD, reviewed documentation, as scribed by Maureen Kinsey in my presence, and it is both accurate and complete.                    Clinical Impression:   Final diagnoses:  [S69.217A] Laceration of left little finger without foreign body  without damage to nail, initial encounter (Primary)      ED Disposition Condition    Discharge Stable          ED Prescriptions    None       Follow-up Information       Follow up With Specialties Details Why Contact Info    [facility]  As needed [address] [place] [address] Group Professional Building  [facility] [address]  [phone]               Bora Montero MD  09/01/22 0574

## 2022-09-02 NOTE — DISCHARGE INSTRUCTIONS
KEEP LACERATION CLEAN WITH SOAP AND WATER.  OTHERWISE KEEP DRY.  RETURN FOR SUTURE REMOVAL IN 12 DAYS.  RETURN IMMEDIATELY FOR ANY INCREASED SWELLING, REDNESS, PAIN OR DRAINAGE OR OTHERWISE AS NEEDED.

## 2022-09-02 NOTE — ED NOTES
Wound cleansed with sterile normal saline, sterile 4x4s applied and wrapped with kerlix. Pt tolerated well.

## 2022-09-07 ENCOUNTER — ANESTHESIA EVENT (OUTPATIENT)
Dept: GASTROENTEROLOGY | Facility: HOSPITAL | Age: 72
End: 2022-09-07
Payer: COMMERCIAL

## 2022-09-07 ENCOUNTER — ANESTHESIA (OUTPATIENT)
Dept: GASTROENTEROLOGY | Facility: HOSPITAL | Age: 72
End: 2022-09-07
Payer: COMMERCIAL

## 2022-09-07 ENCOUNTER — HOSPITAL ENCOUNTER (OUTPATIENT)
Dept: GASTROENTEROLOGY | Facility: HOSPITAL | Age: 72
Discharge: HOME | End: 2022-09-07
Attending: INTERNAL MEDICINE
Payer: COMMERCIAL

## 2022-09-07 VITALS
DIASTOLIC BLOOD PRESSURE: 76 MMHG | DIASTOLIC BLOOD PRESSURE: 83 MMHG | RESPIRATION RATE: 12 BRPM | WEIGHT: 200 LBS | OXYGEN SATURATION: 100 % | TEMPERATURE: 97 F | RESPIRATION RATE: 15 BRPM | BODY MASS INDEX: 27.09 KG/M2 | SYSTOLIC BLOOD PRESSURE: 121 MMHG | HEART RATE: 64 BPM | TEMPERATURE: 97 F | HEART RATE: 77 BPM | SYSTOLIC BLOOD PRESSURE: 140 MMHG | OXYGEN SATURATION: 97 % | HEIGHT: 72 IN

## 2022-09-07 DIAGNOSIS — K29.00 ACUTE SUPERFICIAL GASTRITIS WITHOUT HEMORRHAGE: ICD-10-CM

## 2022-09-07 DIAGNOSIS — K21.9 GASTROESOPHAGEAL REFLUX DISEASE WITHOUT ESOPHAGITIS: ICD-10-CM

## 2022-09-07 DIAGNOSIS — R19.8 ABNORMAL FINDINGS ON ESOPHAGOGASTRODUODENOSCOPY (EGD): ICD-10-CM

## 2022-09-07 PROCEDURE — D9220A PRA ANESTHESIA: ICD-10-PCS | Mod: ,,, | Performed by: NURSE ANESTHETIST, CERTIFIED REGISTERED

## 2022-09-07 PROCEDURE — 37000008 HC ANESTHESIA 1ST 15 MINUTES

## 2022-09-07 PROCEDURE — 00731 ANES UPR GI NDSC PX NOS: CPT

## 2022-09-07 PROCEDURE — 27000716 HC OXISENSOR PROBE, ANY SIZE: Performed by: NURSE ANESTHETIST, CERTIFIED REGISTERED

## 2022-09-07 PROCEDURE — 27000284 HC CANNULA NASAL: Performed by: NURSE ANESTHETIST, CERTIFIED REGISTERED

## 2022-09-07 PROCEDURE — 27201423 OPTIME MED/SURG SUP & DEVICES STERILE SUPPLY

## 2022-09-07 PROCEDURE — 25000003 PHARM REV CODE 250: Performed by: NURSE ANESTHETIST, CERTIFIED REGISTERED

## 2022-09-07 PROCEDURE — 63600175 PHARM REV CODE 636 W HCPCS: Performed by: NURSE ANESTHETIST, CERTIFIED REGISTERED

## 2022-09-07 PROCEDURE — 43239 EGD BIOPSY SINGLE/MULTIPLE: CPT

## 2022-09-07 PROCEDURE — D9220A PRA ANESTHESIA: Mod: ,,, | Performed by: NURSE ANESTHETIST, CERTIFIED REGISTERED

## 2022-09-07 RX ORDER — PROPOFOL 10 MG/ML
VIAL (ML) INTRAVENOUS
Status: DISCONTINUED | OUTPATIENT
Start: 2022-09-07 | End: 2022-09-07

## 2022-09-07 RX ORDER — LIDOCAINE HYDROCHLORIDE 20 MG/ML
INJECTION, SOLUTION EPIDURAL; INFILTRATION; INTRACAUDAL; PERINEURAL
Status: DISCONTINUED | OUTPATIENT
Start: 2022-09-07 | End: 2022-09-07

## 2022-09-07 RX ORDER — SODIUM CHLORIDE 0.9 % (FLUSH) 0.9 %
10 SYRINGE (ML) INJECTION
Status: CANCELLED | OUTPATIENT
Start: 2022-09-07

## 2022-09-07 RX ADMIN — SODIUM CHLORIDE: 9 INJECTION, SOLUTION INTRAVENOUS at 12:09

## 2022-09-07 RX ADMIN — PROPOFOL 50 MG: 10 INJECTION, EMULSION INTRAVENOUS at 01:09

## 2022-09-07 RX ADMIN — PROPOFOL 70 MG: 10 INJECTION, EMULSION INTRAVENOUS at 01:09

## 2022-09-07 RX ADMIN — LIDOCAINE HYDROCHLORIDE 70 MG: 20 INJECTION, SOLUTION INTRAVENOUS at 01:09

## 2022-09-07 NOTE — ANESTHESIA POSTPROCEDURE EVALUATION
Anesthesia Post Evaluation    Patient: Alon Felipe    Procedure(s) Performed: * No procedures listed *    Final Anesthesia Type: general      Patient location during evaluation: GI PACU  Patient participation: Yes- Able to Participate  Level of consciousness: awake and alert  Post-procedure vital signs: reviewed and stable  Pain management: adequate  Airway patency: patent    PONV status at discharge: No PONV  Anesthetic complications: no      Cardiovascular status: blood pressure returned to baseline and hemodynamically stable  Respiratory status: spontaneous ventilation  Hydration status: euvolemic  Follow-up not needed.  Comments: Pt voices appreciation for care          Vitals Value Taken Time   /83 09/07/22 1335   Temp 36.1 °C (97 °F) 09/07/22 1306   Pulse 64 09/07/22 1335   Resp 12 09/07/22 1335   SpO2 100 % 09/07/22 1335         Event Time   Out of Recovery 13:40:35         Pain/Linus Score: Linus Score: 10 (9/7/2022  1:21 PM)

## 2022-09-07 NOTE — ANESTHESIA PREPROCEDURE EVALUATION
09/07/2022  Alon Felipe is a 72 y.o., male.    Past Medical History:   Diagnosis Date    Enlarged prostate     Hypertension     Stroke        Past Surgical History:   Procedure Laterality Date    BACK SURGERY         Family History   Problem Relation Age of Onset    Heart failure Mother     Cancer Father        Social History     Socioeconomic History    Marital status:    Tobacco Use    Smoking status: Never    Smokeless tobacco: Never   Substance and Sexual Activity    Alcohol use: Not Currently    Drug use: Never     Social Determinants of Health     Stress: No Stress Concern Present    Feeling of Stress : Not at all       Current Outpatient Medications   Medication Sig Dispense Refill    albuterol (VENTOLIN HFA) 90 mcg/actuation inhaler Inhale 2 puffs into the lungs every 6 (six) hours as needed for Wheezing. Rescue (Patient not taking: No sig reported) 8 g 0    amLODIPine (NORVASC) 10 MG tablet Take 10 mg by mouth once daily.      aspirin (ECOTRIN) 81 MG EC tablet Take 81 mg by mouth once daily.      benzonatate (TESSALON) 100 MG capsule Take 1 capsule (100 mg total) by mouth 3 (three) times daily as needed for Cough. (Patient not taking: No sig reported) 20 capsule 0    cyanocobalamin 1,000 mcg/mL injection INJECT 1ML INTRAMUSCULARLY MONTHLY FOR ANEMIA      finasteride (PROSCAR) 5 mg tablet Take 1 tablet (5 mg total) by mouth once daily. 90 tablet 3    fluticasone propionate (FLONASE) 50 mcg/actuation nasal spray 1 spray (50 mcg total) by Each Nostril route once daily. 16 g 1    montelukast (SINGULAIR) 10 mg tablet TAKE 1 TABLET BY MOUTH EVERY DAY on empty stomach FOR allergies      olmesartan (BENICAR) 20 MG tablet Take 1 tablet (20 mg total) by mouth once daily. 90 tablet 3    omeprazole (PRILOSEC) 20 MG capsule Take 20 mg by mouth once daily.      predniSONE  (DELTASONE) 20 MG tablet Take 1 tablet (20 mg total) by mouth once daily. (Patient taking differently: Take 20 mg by mouth daily as needed.) 7 tablet 1    predniSONE (DELTASONE) 20 MG tablet Take 1 tablet (20 mg total) by mouth once daily. 7 tablet 1    sildenafiL (VIAGRA) 100 MG tablet TAKE ONE TABLET BY MOUTH ONE-TIME ONE HOUR BEFORE SEXUAL ACTIVITY (MUST NOT TAKE NITROGLYCERIN-TYPE DRUGS WITH SILDENAFIL) CAN BE TAKEN WITHOUT REGARD TO MEALS.      simvastatin (ZOCOR) 20 MG tablet Take 20 mg by mouth every evening.      tamsulosin (FLOMAX) 0.4 mg Cap TAKE ONE CAPSULE BY MOUTH DAILY FOR URINATION. TAKE 30 MINUTES AFTER EVENING MEAL.  REPLACES ALFUZOSIN. 90 capsule 3    testosterone (ANDROGEL) 1 % (50 mg/5 gram) GlPk Apply topically once daily.       No current facility-administered medications for this encounter.       Review of patient's allergies indicates:   Allergen Reactions    Iodine and iodide containing products Itching and Swelling       Pre-op Assessment    I have reviewed the Patient Summary Reports.     I have reviewed the Nursing Notes. I have reviewed the NPO Status.   I have reviewed the Medications.     Review of Systems  Anesthesia Hx:  No problems with previous Anesthesia  Family Hx of Anesthesia complications:  Personal Hx of Anesthesia complications   Social:  Non-Smoker    Hematology/Oncology:  Hematology Normal   Oncology Normal     EENT/Dental:EENT/Dental Normal   Cardiovascular:   Hypertension    Pulmonary:  Pulmonary Normal    Renal/:  Renal/ Normal     Hepatic/GI:  Hepatic/GI Normal    Musculoskeletal:  Musculoskeletal Normal    Neurological:   CVA Neuromuscular Disease,    Endocrine:  Endocrine Normal    Dermatological:  Skin Normal    Psych:  Psychiatric Normal           Physical Exam  General: Well nourished, Cooperative, Alert and Oriented    Airway:  Mallampati: II   Mouth Opening: Normal  TM Distance: Normal  Tongue: Normal  Neck ROM: Normal ROM    Chest/Lungs:  Clear to  auscultation, Normal Respiratory Rate    Heart:  Rate: Normal  Rhythm: Regular Rhythm    Abdomen:  Normal, Soft        Anesthesia Plan  Type of Anesthesia, risks & benefits discussed:    Anesthesia Type: Gen Natural Airway  Intra-op Monitoring Plan: Standard ASA Monitors  Post Op Pain Control Plan: multimodal analgesia  Induction:  IV  Informed Consent: Informed consent signed with the Patient and all parties understand the risks and agree with anesthesia plan.  All questions answered. Patient consented to blood products? Yes  ASA Score: 3    Ready For Surgery From Anesthesia Perspective.     .

## 2022-09-07 NOTE — H&P
[facility]  Gastroenterology  H&P    Patient Name: Alon Felipe  MRN: [id number]  Admission Date: 9/7/2022  Code Status: No Order    Attending Provider: Sohail Rojas MD   Primary Care Physician: SKYE Hylton  Principal Problem:<principal problem not specified>    Subjective:     History of Present Illness: Pt has c/o sinus drainage and chronic gerd; for egd.    Past Medical History:   Diagnosis Date    Enlarged prostate     Hypertension     Stroke        Past Surgical History:   Procedure Laterality Date    BACK SURGERY         Review of patient's allergies indicates:   Allergen Reactions    Iodine and iodide containing products Itching and Swelling     Family History       Problem Relation (Age of Onset)    Cancer Father    Heart failure Mother          Tobacco Use    Smoking status: Never    Smokeless tobacco: Never   Substance and Sexual Activity    Alcohol use: Not Currently    Drug use: Never    Sexual activity: Not on file     Review of Systems   Respiratory: Negative.     Cardiovascular: Negative.    Gastrointestinal: Negative.    Objective:     Vital Signs (Most Recent):  Temp: 98.1 °F (36.7 °C) (09/07/22 1209)  Pulse: 69 (09/07/22 1211)  Resp: 14 (09/07/22 1211)  BP: (!) 143/83 (09/07/22 1211)  SpO2: 99 % (09/07/22 1211)   Vital Signs (24h Range):  Temp:  [98.1 °F (36.7 °C)] 98.1 °F (36.7 °C)  Pulse:  [69] 69  Resp:  [14] 14  SpO2:  [99 %] 99 %  BP: (143)/(83) 143/83     Weight: 90.7 kg (200 lb) (09/07/22 1209)  Body mass index is 27.12 kg/m².    No intake or output data in the 24 hours ending 09/07/22 1259    Lines/Drains/Airways       Peripheral Intravenous Line  Duration                  Peripheral IV - Single Lumen 09/07/22 1209 22 G Anterior;Right Hand <1 day                    Physical Exam  Vitals reviewed.   Constitutional:       General: He is not in acute distress.     Appearance: Normal appearance. He is well-developed. He is not ill-appearing.   HENT:      Head:  Normocephalic and atraumatic.      Nose: Nose normal.   Eyes:      Pupils: Pupils are equal, round, and reactive to light.   Cardiovascular:      Rate and Rhythm: Normal rate and regular rhythm.   Pulmonary:      Effort: Pulmonary effort is normal.      Breath sounds: Normal breath sounds. No wheezing.   Abdominal:      General: Abdomen is flat. Bowel sounds are normal. There is no distension.      Palpations: Abdomen is soft.      Tenderness: There is no abdominal tenderness. There is no guarding.   Skin:     General: Skin is warm and dry.      Coloration: Skin is not jaundiced.   Neurological:      Mental Status: He is alert.   Psychiatric:         Attention and Perception: Attention normal.         Mood and Affect: Affect normal.         Speech: Speech normal.         Behavior: Behavior is cooperative.      Comments: Pt was calm while speaking.       Significant Labs:  CBC: No results for input(s): WBC, HGB, HCT, PLT in the last 48 hours.  CMP: No results for input(s): GLU, CALCIUM, ALBUMIN, PROT, NA, K, CO2, CL, BUN, CREATININE, ALKPHOS, ALT, AST, BILITOT in the last 48 hours.    Significant Imaging:  Imaging results within the past 24 hours have been reviewed.    Assessment/Plan:     There are no hospital problems to display for this patient.        Imp: gerd  Plan: egd    Sohail Rojas MD  Gastroenterology  [facility]

## 2022-09-07 NOTE — DISCHARGE INSTRUCTIONS
Procedure Date  9/7/22     Impression  Overall Impression: Mucosa of the esophagus is normal with z-line at 42cm. The mid and distal esophagus was biopsied. The stomach had mild erythema without ulcers, biopsies were obtained. Mucosa of the duodenum was normal.     Recommendation    Await pathology results         Continue PPI or H2RA as needed.  NO DRIVING, OPERATING EQUIPMENT, OR SIGNING LEGAL DOCUMENTS FOR 24 HOURS.   THE NURSE WILL CALL YOU WITH YOUR BIOPSY RESULTS IN A FEW DAYS.

## 2022-09-08 LAB
ESTROGEN SERPL-MCNC: NORMAL PG/ML
INSULIN SERPL-ACNC: NORMAL U[IU]/ML
LAB AP GROSS DESCRIPTION: NORMAL
LAB AP LABORATORY NOTES: NORMAL
T3RU NFR SERPL: NORMAL %

## 2022-09-09 ENCOUNTER — OFFICE VISIT (OUTPATIENT)
Dept: UROLOGY | Facility: CLINIC | Age: 72
End: 2022-09-09
Payer: COMMERCIAL

## 2022-09-09 VITALS
BODY MASS INDEX: 28.04 KG/M2 | WEIGHT: 207 LBS | DIASTOLIC BLOOD PRESSURE: 78 MMHG | SYSTOLIC BLOOD PRESSURE: 127 MMHG | HEART RATE: 76 BPM | HEIGHT: 72 IN

## 2022-09-09 DIAGNOSIS — R53.83 FATIGUE, UNSPECIFIED TYPE: ICD-10-CM

## 2022-09-09 DIAGNOSIS — E29.1 TESTICULAR HYPOGONADISM: Primary | ICD-10-CM

## 2022-09-09 PROCEDURE — 1101F PR PT FALLS ASSESS DOC 0-1 FALLS W/OUT INJ PAST YR: ICD-10-PCS | Mod: ,,, | Performed by: UROLOGY

## 2022-09-09 PROCEDURE — 99211 PR OFFICE/OUTPT VISIT, EST, LEVL I: ICD-10-PCS | Mod: S$PBB,25,, | Performed by: UROLOGY

## 2022-09-09 PROCEDURE — 3074F SYST BP LT 130 MM HG: CPT | Mod: ,,, | Performed by: UROLOGY

## 2022-09-09 PROCEDURE — 3008F PR BODY MASS INDEX (BMI) DOCUMENTED: ICD-10-PCS | Mod: ,,, | Performed by: UROLOGY

## 2022-09-09 PROCEDURE — 4010F ACE/ARB THERAPY RXD/TAKEN: CPT | Mod: ,,, | Performed by: UROLOGY

## 2022-09-09 PROCEDURE — 3078F DIAST BP <80 MM HG: CPT | Mod: ,,, | Performed by: UROLOGY

## 2022-09-09 PROCEDURE — 3008F BODY MASS INDEX DOCD: CPT | Mod: ,,, | Performed by: UROLOGY

## 2022-09-09 PROCEDURE — 1159F MED LIST DOCD IN RCRD: CPT | Mod: ,,, | Performed by: UROLOGY

## 2022-09-09 PROCEDURE — 1101F PT FALLS ASSESS-DOCD LE1/YR: CPT | Mod: ,,, | Performed by: UROLOGY

## 2022-09-09 PROCEDURE — 99211 OFF/OP EST MAY X REQ PHY/QHP: CPT | Mod: S$PBB,25,, | Performed by: UROLOGY

## 2022-09-09 PROCEDURE — 96372 THER/PROPH/DIAG INJ SC/IM: CPT | Mod: PBBFAC | Performed by: UROLOGY

## 2022-09-09 PROCEDURE — 3078F PR MOST RECENT DIASTOLIC BLOOD PRESSURE < 80 MM HG: ICD-10-PCS | Mod: ,,, | Performed by: UROLOGY

## 2022-09-09 PROCEDURE — 99215 OFFICE O/P EST HI 40 MIN: CPT | Mod: PBBFAC | Performed by: UROLOGY

## 2022-09-09 PROCEDURE — 1126F PR PAIN SEVERITY QUANTIFIED, NO PAIN PRESENT: ICD-10-PCS | Mod: ,,, | Performed by: UROLOGY

## 2022-09-09 PROCEDURE — 3288F PR FALLS RISK ASSESSMENT DOCUMENTED: ICD-10-PCS | Mod: ,,, | Performed by: UROLOGY

## 2022-09-09 PROCEDURE — 3074F PR MOST RECENT SYSTOLIC BLOOD PRESSURE < 130 MM HG: ICD-10-PCS | Mod: ,,, | Performed by: UROLOGY

## 2022-09-09 PROCEDURE — 1126F AMNT PAIN NOTED NONE PRSNT: CPT | Mod: ,,, | Performed by: UROLOGY

## 2022-09-09 PROCEDURE — 3288F FALL RISK ASSESSMENT DOCD: CPT | Mod: ,,, | Performed by: UROLOGY

## 2022-09-09 PROCEDURE — 4010F PR ACE/ARB THEARPY RXD/TAKEN: ICD-10-PCS | Mod: ,,, | Performed by: UROLOGY

## 2022-09-09 PROCEDURE — 1159F PR MEDICATION LIST DOCUMENTED IN MEDICAL RECORD: ICD-10-PCS | Mod: ,,, | Performed by: UROLOGY

## 2022-09-09 RX ADMIN — TESTOSTERONE UNDECANOATE 750 MG: 250 INJECTION INTRAMUSCULAR at 09:09

## 2022-09-09 NOTE — PATIENT INSTRUCTIONS
No Labs prior to appointment.  Aveed given.  Mr. Felipe waited 30 minutes prior to leaving and had no signs of complications from the procedure.  Return in 4 weeks for next Aveed.

## 2022-09-10 NOTE — PROGRESS NOTES
Subjective:       Patient ID: Alon Felipe is a 72 y.o. male.    Chief Complaint: Follow-up (Aveed H&H and Testosterone)       Urology History  Symptoms:  No dysuria, No urethral discharge, No hematuria, No erectile dysfunction  Associated Symptoms:  No abdominal pain, No edema  History of Present Illness  This 64-year-old white male still works with [employer]. He has a history as noted in the dictation of 1/16/2012 when I last saw him. He is seen about every 2 years. He is on Flomax and Uroxatral as well as Cialis. We had to encourage him to return for followup visit.      He has not had any flare-ups with his chronic bacterial prostatitis. The patient still voids adequately then he [facility]. He takes super beta prostate in addition to the Uroxatral and Avodart.      I talked with him about Cialis. He normally takes a 20 mg dose. He is able to penetrate but he had poor quality erections. I suggested that he switch to the 5 mg dose and try two 5s for total of 10 mg, etc. I gave him a sample box and a coupon. By rectal examination, he has a 20 to 30 grams size prostate that feels benign. He understands that I plan to retire.  (December 9, 2014)  ------------------------------------------------------------------------------------------------------------------------------------------------------------  The above note is the last clinic note of Dr. Dani De La Vega     Mr. Felipe is 68 years old. He is the  of Ms. Jami Felipe. Patient been on combination therapy for bladder outlet obstruction from BPH for several years with Avodart and tamsulosin. He used to be on 5 mg Cialis but is no longer on that. He is having nocturia generally no more than one time nightly. [facility]. Patient has been on testosterone injections for several years but did not get really good results from that because of the peaks and valleys it produced from only taking once a  month. Not on anything for testicular hypogonadism at present. Voiding okay currently; has nocturia one time nightly but IPSS score is 19. (June 18, 2018)     Mr. Felipe is in for the first time in nearly one year. He had been using injectable testosterone but we switched him to testosterone cream on April 30 and he's been on 5% testosterone since. He does not think he has as much energy as he had before, but doing fairly well. He has some urinary frequency in the morning after he drinks his coffee but no nocturia. No worsening bladder outlet obstructive symptoms. Generally feeling okay. (Jerica 3, 2019)     Mr. Felipe has been doing reasonably well although his energy level is not as good as when he took testosterone shots. That is not surprising. Testosterone is pending as he had his level drawn this morning. No worsening bladder outlet obstruction. He has frequency worse in the morning after he drinks coffee but does not have nocturia but one time nightly typically. Overall satisfactory and doing okay. Desires no additional help with voiding. Serum testosterone level pending.  (Jerica 15, 2020)     PSA Results:  Past PSA Results   PSA was 0.81 on April 30, 2020  PSA was 0.76 on March 3, 2017  PSA was 0.851 on January 16, 2012  PSA was 0.706 on February 28, 2007  PSA was 1.74 on January 30, 2006  --------------------------------------------------------------------------------------------------     PSA was 0.53 on 3/18/2021  PSA was 0.425 on February 18, 2022     Mr. Felipe is in for his 1 year follow-up.  He has testicular hypogonadism and is on 9% testosterone.  Recent testosterone is barely in therapeutic range despite using his testosterone cream on a regular basis as directed.  Testosterone is lower than last year.  We can go up on concentration again.  He had a PSA in March that was 0.53.  [facility]  Had recent respiratory illness but has recovered from that.   Energy level not as good as desired but he is quite active.   (July 28, 2021)     Mr. Felipe is in for 1st visit in 1 year.  He has continued on 10% testosterone but wants to see if he can be switched to Aveed.  He would find that to be more convenient if we can get him approved.  Needs renewal of finasteride and tamsulosin.  Voiding is stable and he does not feel he needs any extra help with voiding.  He had his PSA done and it is normal at 0.52.  We will delay getting a serum testosterone until after he starts the Aveed and get it done after the 1st couple of injections. (August 9, 2022)     Mr. Felipe was approved for his Aveed.  He returned today for 1st injection.  Doing okay clinically. [September 9, 2022]  Review of Systems      Objective:      Physical Exam  Constitutional:       Appearance: Normal appearance.   Neurological:      Mental Status: He is alert.   Psychiatric:         Mood and Affect: Mood normal.         Behavior: Behavior normal.     Urinalysis revealed occasional pus and occasional epithelial cells.  The dipstick had a trace of blood with pH of 6.0 and specific gravity 1.020  Assessment:       Problem List Items Addressed This Visit    None  Visit Diagnoses       Testicular hypogonadism    -  Primary    Relevant Medications    testosterone undecanoate injection 750 mg (Completed)    Other Relevant Orders    Prior authorization Order    Fatigue, unspecified type                  Plan:        No Labs prior to appointment.  Aveed given.  Mr. Felipe waited 30 minutes prior to leaving and had no signs of complications from the procedure.  Return in 4 weeks for next Aveed.

## 2022-09-12 ENCOUNTER — TELEPHONE (OUTPATIENT)
Dept: GASTROENTEROLOGY | Facility: CLINIC | Age: 72
End: 2022-09-12
Payer: COMMERCIAL

## 2022-09-12 NOTE — TELEPHONE ENCOUNTER
Called patient to discuss results and verbalized understanding.  ----- Message from Sohail Rojas MD sent at 9/8/2022  3:21 PM CDT -----  EGD bx shows gastritis and reflux esophagitis.  ----- Message -----  From: Background User Lab  Sent: 9/8/2022   3:19 PM CDT  To: Sohail Rojas MD

## 2022-10-10 ENCOUNTER — OFFICE VISIT (OUTPATIENT)
Dept: UROLOGY | Facility: CLINIC | Age: 72
End: 2022-10-10
Payer: COMMERCIAL

## 2022-10-10 VITALS
WEIGHT: 209 LBS | HEIGHT: 72 IN | HEART RATE: 60 BPM | DIASTOLIC BLOOD PRESSURE: 84 MMHG | BODY MASS INDEX: 28.31 KG/M2 | SYSTOLIC BLOOD PRESSURE: 171 MMHG

## 2022-10-10 DIAGNOSIS — E29.1 TESTICULAR HYPOGONADISM: Primary | ICD-10-CM

## 2022-10-10 DIAGNOSIS — R53.83 FATIGUE, UNSPECIFIED TYPE: ICD-10-CM

## 2022-10-10 PROCEDURE — 96372 THER/PROPH/DIAG INJ SC/IM: CPT | Mod: PBBFAC | Performed by: UROLOGY

## 2022-10-10 PROCEDURE — 99211 PR OFFICE/OUTPT VISIT, EST, LEVL I: ICD-10-PCS | Mod: S$PBB,25,, | Performed by: UROLOGY

## 2022-10-10 PROCEDURE — 3008F PR BODY MASS INDEX (BMI) DOCUMENTED: ICD-10-PCS | Mod: ,,, | Performed by: UROLOGY

## 2022-10-10 PROCEDURE — 4010F PR ACE/ARB THEARPY RXD/TAKEN: ICD-10-PCS | Mod: ,,, | Performed by: UROLOGY

## 2022-10-10 PROCEDURE — 3288F FALL RISK ASSESSMENT DOCD: CPT | Mod: ,,, | Performed by: UROLOGY

## 2022-10-10 PROCEDURE — 1159F MED LIST DOCD IN RCRD: CPT | Mod: ,,, | Performed by: UROLOGY

## 2022-10-10 PROCEDURE — 3077F PR MOST RECENT SYSTOLIC BLOOD PRESSURE >= 140 MM HG: ICD-10-PCS | Mod: ,,, | Performed by: UROLOGY

## 2022-10-10 PROCEDURE — 4010F ACE/ARB THERAPY RXD/TAKEN: CPT | Mod: ,,, | Performed by: UROLOGY

## 2022-10-10 PROCEDURE — 99211 OFF/OP EST MAY X REQ PHY/QHP: CPT | Mod: S$PBB,25,, | Performed by: UROLOGY

## 2022-10-10 PROCEDURE — 1101F PT FALLS ASSESS-DOCD LE1/YR: CPT | Mod: ,,, | Performed by: UROLOGY

## 2022-10-10 PROCEDURE — 99214 OFFICE O/P EST MOD 30 MIN: CPT | Mod: PBBFAC | Performed by: UROLOGY

## 2022-10-10 PROCEDURE — 1101F PR PT FALLS ASSESS DOC 0-1 FALLS W/OUT INJ PAST YR: ICD-10-PCS | Mod: ,,, | Performed by: UROLOGY

## 2022-10-10 PROCEDURE — 3008F BODY MASS INDEX DOCD: CPT | Mod: ,,, | Performed by: UROLOGY

## 2022-10-10 PROCEDURE — 3288F PR FALLS RISK ASSESSMENT DOCUMENTED: ICD-10-PCS | Mod: ,,, | Performed by: UROLOGY

## 2022-10-10 PROCEDURE — 1126F PR PAIN SEVERITY QUANTIFIED, NO PAIN PRESENT: ICD-10-PCS | Mod: ,,, | Performed by: UROLOGY

## 2022-10-10 PROCEDURE — 3077F SYST BP >= 140 MM HG: CPT | Mod: ,,, | Performed by: UROLOGY

## 2022-10-10 PROCEDURE — 1159F PR MEDICATION LIST DOCUMENTED IN MEDICAL RECORD: ICD-10-PCS | Mod: ,,, | Performed by: UROLOGY

## 2022-10-10 PROCEDURE — 3079F DIAST BP 80-89 MM HG: CPT | Mod: ,,, | Performed by: UROLOGY

## 2022-10-10 PROCEDURE — 1126F AMNT PAIN NOTED NONE PRSNT: CPT | Mod: ,,, | Performed by: UROLOGY

## 2022-10-10 PROCEDURE — 3079F PR MOST RECENT DIASTOLIC BLOOD PRESSURE 80-89 MM HG: ICD-10-PCS | Mod: ,,, | Performed by: UROLOGY

## 2022-10-10 RX ADMIN — TESTOSTERONE UNDECANOATE 750 MG: 250 INJECTION INTRAMUSCULAR at 12:10

## 2022-10-10 NOTE — PROGRESS NOTES
Subjective:       Patient ID: Alon Felipe is a 72 y.o. male.    Chief Complaint: Follow-up (Aveed 4 week injection, No labs)    Urology History  Symptoms:  No dysuria, No urethral discharge, No hematuria, No erectile dysfunction  Associated Symptoms:  No abdominal pain, No edema  History of Present Illness  This 64-year-old white male still works with [employer]. He has a history as noted in the dictation of 1/16/2012 when I last saw him. He is seen about every 2 years. He is on Flomax and Uroxatral as well as Cialis. We had to encourage him to return for followup visit.      He has not had any flare-ups with his chronic bacterial prostatitis. The patient still voids adequately then he [facility]. He takes super beta prostate in addition to the Uroxatral and Avodart.      I talked with him about Cialis. He normally takes a 20 mg dose. He is able to penetrate but he had poor quality erections. I suggested that he switch to the 5 mg dose and try two 5s for total of 10 mg, etc. I gave him a sample box and a coupon. By rectal examination, he has a 20 to 30 grams size prostate that feels benign. He understands that I plan to retire.  (December 9, 2014)  ------------------------------------------------------------------------------------------------------------------------------------------------------------  The above note is the last clinic note of Dr. Dani De La Vega     Mr. Felipe is 68 years old. He is the  of Ms. Jami Felipe. Patient been on combination therapy for bladder outlet obstruction from BPH for several years with Avodart and tamsulosin. He used to be on 5 mg Cialis but is no longer on that. He is having nocturia generally no more than one time nightly. [facility] 0.76. Patient has been on testosterone injections for several years but did not get really good results from that because of the peaks and valleys it produced from only taking once  a month. Not on anything for testicular hypogonadism at present. Voiding okay currently; has nocturia one time nightly but IPSS score is 19. (June 18, 2018)     Mr. Felipe is in for the first time in nearly one year. He had been using injectable testosterone but we switched him to testosterone cream on April 30 and he's been on 5% testosterone since. He does not think he has as much energy as he had before, but doing fairly well. He has some urinary frequency in the morning after he drinks his coffee but no nocturia. No worsening bladder outlet obstructive symptoms. Generally feeling okay. (Jerica 3, 2019)     Mr. Felipe has been doing reasonably well although his energy level is not as good as when he took testosterone shots. That is not surprising. Testosterone is pending as he had his level drawn this morning. No worsening bladder outlet obstruction. He has frequency worse in the morning after he drinks coffee but does not have nocturia but one time nightly typically. Overall satisfactory and doing okay. Desires no additional help with voiding. Serum testosterone level pending.  (Jerica 15, 2020)     PSA Results:  Past PSA Results   PSA was 0.81 on April 30, 2020  PSA was 0.76 on March 3, 2017  PSA was 0.851 on January 16, 2012  PSA was 0.706 on February 28, 2007  PSA was 1.74 on January 30, 2006  --------------------------------------------------------------------------------------------------     PSA was 0.53 on 3/18/2021  PSA was 0.425 on February 18, 2022     Mr. Felipe is in for his 1 year follow-up.  He has testicular hypogonadism and is on 9% testosterone.  Recent testosterone is barely in therapeutic range despite using his testosterone cream on a regular basis as directed.  Testosterone is lower than last year.  We can go up on concentration again.  He had a PSA in March that was 0.53.  [facility]  Had recent respiratory illness but has recovered from that.   Energy level not as good as desired but he is quite active.   (July 28, 2021)     Mr. Felipe is in for 1st visit in 1 year.  He has continued on 10% testosterone but wants to see if he can be switched to Aveed.  He would find that to be more convenient if we can get him approved.  Needs renewal of finasteride and tamsulosin.  Voiding is stable and he does not feel he needs any extra help with voiding.  He had his PSA done and it is normal at 0.52.  We will delay getting a serum testosterone until after he starts the Aveed and get it done after the 1st couple of injections. (August 9, 2022)     Mr. Felipe was approved for his Aveed.  He returned today for 1st injection.  Doing okay clinically. [September 9, 2022]    Mr. Felipe is feeling okay and feels that his energy level is satisfactory.  We did not do testosterone but so far he seems to be responding favorably to the Aveed.  He is here for injection #2 today.  Review of Systems      Objective:      Physical Exam  Constitutional:       Appearance: Normal appearance. He is normal weight.   Neurological:      Mental Status: He is alert.       Assessment:       Problem List Items Addressed This Visit    None  Visit Diagnoses       Testicular hypogonadism    -  Primary    Relevant Medications    testosterone undecanoate injection 750 mg (Completed)    Other Relevant Orders    Prior authorization Order    Fatigue, unspecified type        Relevant Medications    testosterone undecanoate injection 750 mg (Completed)    Other Relevant Orders    Prior authorization Order              Plan:         Aveed #2 given without problems.  The patient waited 30 minutes prior to leaving clinic with no indication of problems.  Return for next Aveed in 10 weeks and we will repeat the serum testosterone a month or so after that.

## 2022-11-18 NOTE — PATIENT INSTRUCTIONS
TRANSFER - OUT REPORT:    Verbal report given to EVANS Emerson RN(name) on Jomar Hassan  being transferred to [facility](unit) for routine post - op       Report consisted of patients Situation, Background, Assessment and   Recommendations(SBAR). Information from the following report(s) SBAR, OR Summary, Intake/Output, MAR, and Quality Measures was reviewed with the receiving nurse. Lines:   Peripheral IV 11/18/22 Posterior;Right Hand (Active)   Site Assessment Clean, dry, & intact 11/18/22 1227   Phlebitis Assessment 0 11/18/22 1227   Infiltration Assessment 0 11/18/22 1227   Dressing Status Clean, dry, & intact 11/18/22 1227   Dressing Type Transparent 11/18/22 1227   Hub Color/Line Status Patent;Pink; Infusing 11/18/22 1227   Alcohol Cap Used No 11/18/22 1227        Opportunity for questions and clarification was provided.       Patient transported with:   Registered Nurse    Visit Vitals  BP (!) 171/63 (BP 1 Location: Left upper arm, BP Patient Position: At rest;Reclining)   Pulse 71   Temp 97.6 °F (36.4 °C)   Resp 15   Wt 105 kg (231 lb 6.4 oz)   SpO2 94%   BMI 42.32 kg/m² flonase or nasonex

## 2022-12-19 ENCOUNTER — OFFICE VISIT (OUTPATIENT)
Dept: UROLOGY | Facility: CLINIC | Age: 72
End: 2022-12-19
Payer: COMMERCIAL

## 2022-12-19 VITALS
WEIGHT: 211 LBS | DIASTOLIC BLOOD PRESSURE: 85 MMHG | HEIGHT: 72 IN | SYSTOLIC BLOOD PRESSURE: 158 MMHG | BODY MASS INDEX: 28.58 KG/M2 | HEART RATE: 87 BPM

## 2022-12-19 DIAGNOSIS — E29.1 TESTICULAR HYPOGONADISM: Primary | ICD-10-CM

## 2022-12-19 DIAGNOSIS — R53.83 FATIGUE, UNSPECIFIED TYPE: ICD-10-CM

## 2022-12-19 PROCEDURE — 3288F PR FALLS RISK ASSESSMENT DOCUMENTED: ICD-10-PCS | Mod: ,,, | Performed by: UROLOGY

## 2022-12-19 PROCEDURE — 3008F PR BODY MASS INDEX (BMI) DOCUMENTED: ICD-10-PCS | Mod: ,,, | Performed by: UROLOGY

## 2022-12-19 PROCEDURE — 1160F RVW MEDS BY RX/DR IN RCRD: CPT | Mod: ,,, | Performed by: UROLOGY

## 2022-12-19 PROCEDURE — 1126F PR PAIN SEVERITY QUANTIFIED, NO PAIN PRESENT: ICD-10-PCS | Mod: ,,, | Performed by: UROLOGY

## 2022-12-19 PROCEDURE — 3079F PR MOST RECENT DIASTOLIC BLOOD PRESSURE 80-89 MM HG: ICD-10-PCS | Mod: ,,, | Performed by: UROLOGY

## 2022-12-19 PROCEDURE — 1101F PR PT FALLS ASSESS DOC 0-1 FALLS W/OUT INJ PAST YR: ICD-10-PCS | Mod: ,,, | Performed by: UROLOGY

## 2022-12-19 PROCEDURE — 3077F PR MOST RECENT SYSTOLIC BLOOD PRESSURE >= 140 MM HG: ICD-10-PCS | Mod: ,,, | Performed by: UROLOGY

## 2022-12-19 PROCEDURE — 3077F SYST BP >= 140 MM HG: CPT | Mod: ,,, | Performed by: UROLOGY

## 2022-12-19 PROCEDURE — 99214 OFFICE O/P EST MOD 30 MIN: CPT | Mod: PBBFAC | Performed by: UROLOGY

## 2022-12-19 PROCEDURE — 3079F DIAST BP 80-89 MM HG: CPT | Mod: ,,, | Performed by: UROLOGY

## 2022-12-19 PROCEDURE — 3288F FALL RISK ASSESSMENT DOCD: CPT | Mod: ,,, | Performed by: UROLOGY

## 2022-12-19 PROCEDURE — 3008F BODY MASS INDEX DOCD: CPT | Mod: ,,, | Performed by: UROLOGY

## 2022-12-19 PROCEDURE — 1160F PR REVIEW ALL MEDS BY PRESCRIBER/CLIN PHARMACIST DOCUMENTED: ICD-10-PCS | Mod: ,,, | Performed by: UROLOGY

## 2022-12-19 PROCEDURE — 96372 THER/PROPH/DIAG INJ SC/IM: CPT | Mod: PBBFAC | Performed by: UROLOGY

## 2022-12-19 PROCEDURE — 99212 OFFICE O/P EST SF 10 MIN: CPT | Mod: S$PBB,25,, | Performed by: UROLOGY

## 2022-12-19 PROCEDURE — 1101F PT FALLS ASSESS-DOCD LE1/YR: CPT | Mod: ,,, | Performed by: UROLOGY

## 2022-12-19 PROCEDURE — 1159F MED LIST DOCD IN RCRD: CPT | Mod: ,,, | Performed by: UROLOGY

## 2022-12-19 PROCEDURE — 1126F AMNT PAIN NOTED NONE PRSNT: CPT | Mod: ,,, | Performed by: UROLOGY

## 2022-12-19 PROCEDURE — 4010F ACE/ARB THERAPY RXD/TAKEN: CPT | Mod: ,,, | Performed by: UROLOGY

## 2022-12-19 PROCEDURE — 99212 PR OFFICE/OUTPT VISIT, EST, LEVL II, 10-19 MIN: ICD-10-PCS | Mod: S$PBB,25,, | Performed by: UROLOGY

## 2022-12-19 PROCEDURE — 1159F PR MEDICATION LIST DOCUMENTED IN MEDICAL RECORD: ICD-10-PCS | Mod: ,,, | Performed by: UROLOGY

## 2022-12-19 PROCEDURE — 4010F PR ACE/ARB THEARPY RXD/TAKEN: ICD-10-PCS | Mod: ,,, | Performed by: UROLOGY

## 2022-12-19 RX ADMIN — TESTOSTERONE UNDECANOATE 750 MG: 250 INJECTION INTRAMUSCULAR at 09:12

## 2022-12-19 NOTE — PROGRESS NOTES
Subjective:       Patient ID: Alon Felipe is a 72 y.o. male.    Chief Complaint: Follow-up (Aveed 2nd 10 week shot)    Urology History  Symptoms:  No dysuria, No urethral discharge, No hematuria, No erectile dysfunction  Associated Symptoms:  No abdominal pain, No edema  History of Present Illness  This 64-year-old white male still works with [employer]. He has a history as noted in the dictation of 1/16/2012 when I last saw him. He is seen about every 2 years. He is on Flomax and Uroxatral as well as Cialis. We had to encourage him to return for followup visit.      He has not had any flare-ups with his chronic bacterial prostatitis. The patient still voids adequately then he [facility]. He takes super beta prostate in addition to the Uroxatral and Avodart.      I talked with him about Cialis. He normally takes a 20 mg dose. He is able to penetrate but he had poor quality erections. I suggested that he switch to the 5 mg dose and try two 5s for total of 10 mg, etc. I gave him a sample box and a coupon. By rectal examination, he has a 20 to 30 grams size prostate that feels benign. He understands that I plan to retire.  (December 9, 2014)  ------------------------------------------------------------------------------------------------------------------------------------------------------------  The above note is the last clinic note of Dr. Dani De La Vega     Mr. Felipe is 68 years old. He is the  of Ms. Jami Felipe. Patient been on combination therapy for bladder outlet obstruction from BPH for several years with Avodart and tamsulosin. He used to be on 5 mg Cialis but is no longer on that. He is having nocturia generally no more than one time nightly. [facility]. Patient has been on testosterone injections for several years but did not get really good results from that because of the peaks and valleys it produced from only taking once a month.  Not on anything for testicular hypogonadism at present. Voiding okay currently; has nocturia one time nightly but IPSS score is 19. (June 18, 2018)     Mr. Felipe is in for the first time in nearly one year. He had been using injectable testosterone but we switched him to testosterone cream on April 30 and he's been on 5% testosterone since. He does not think he has as much energy as he had before, but doing fairly well. He has some urinary frequency in the morning after he drinks his coffee but no nocturia. No worsening bladder outlet obstructive symptoms. Generally feeling okay. (Jerica 3, 2019)     Mr. Felipe has been doing reasonably well although his energy level is not as good as when he took testosterone shots. That is not surprising. Testosterone is pending as he had his level drawn this morning. No worsening bladder outlet obstruction. He has frequency worse in the morning after he drinks coffee but does not have nocturia but one time nightly typically. Overall satisfactory and doing okay. Desires no additional help with voiding. Serum testosterone level pending.  (Jerica 15, 2020)     PSA Results:  Past PSA Results   PSA was 0.81 on April 30, 2020  PSA was 0.76 on March 3, 2017  PSA was 0.851 on January 16, 2012  PSA was 0.706 on February 28, 2007  PSA was 1.74 on January 30, 2006  --------------------------------------------------------------------------------------------------     PSA was 0.53 on 3/18/2021  PSA was 0.425 on February 18, 2022     Mr. Felipe is in for his 1 year follow-up.  He has testicular hypogonadism and is on 9% testosterone.  Recent testosterone is barely in therapeutic range despite using his testosterone cream on a regular basis as directed.  Testosterone is lower than last year.  We can go up on concentration again.  He had a PSA in March that was 0.53.  [facility]  Had recent respiratory illness but has recovered from that.  Energy  level not as good as desired but he is quite active.   (July 28, 2021)     Mr. Felipe is in for 1st visit in 1 year.  He has continued on 10% testosterone but wants to see if he can be switched to Aveed.  He would find that to be more convenient if we can get him approved.  Needs renewal of finasteride and tamsulosin.  Voiding is stable and he does not feel he needs any extra help with voiding.  He had his PSA done and it is normal at 0.52.  We will delay getting a serum testosterone until after he starts the Aveed and get it done after the 1st couple of injections. (August 9, 2022)     Mr. Felipe was approved for his Aveed.  He returned today for 1st injection.  Doing okay clinically. [September 9, 2022]     Mr. Felipe is feeling okay and feels that his energy level is satisfactory.  We did not do testosterone but so far he seems to be responding favorably to the Aveed.  He is here for injection #2 today.    Mr. Felipe is here for his 3rd Aveed and is generally pleased with the results as is fatigue is better although has had some fall off for last week or so.  Other than that has been pleased with the improvement in his clinical symptoms.  No new medical issues.  [December 19, 2022]  Review of Systems      Objective:      Physical Exam  Constitutional:       Appearance: Normal appearance. He is normal weight.   Neurological:      Mental Status: He is alert.   Psychiatric:         Mood and Affect: Mood normal.         Behavior: Behavior normal.     Urinalysis negative with occasional pus.  The dipstick had a trace of blood with pH 6.0 and specific gravity 1.020  Assessment:       Problem List Items Addressed This Visit    None  Visit Diagnoses       Testicular hypogonadism    -  Primary    Relevant Medications    testosterone undecanoate injection 750 mg (Completed)    Other Relevant Orders    Prior authorization Order    Testosterone    Hemoglobin and Hematocrit    Fatigue, unspecified type                  Plan:            Patient given AVEED.  Testosterone reported after patient left and is in mid therapeutic range at 530.  H&H satisfactory.  Reported values to patient's wife.  Return for next testosterone, H&H, and next AVEED in 10 weeks

## 2022-12-20 NOTE — PATIENT INSTRUCTIONS
Patient given AVEED.  Testosterone reported after patient left and is in mid therapeutic range at 530.  H&H satisfactory.  Reported values to patient's wife.  Return for next testosterone, H&H, and next AVEED in 10 weeks.

## 2023-02-27 ENCOUNTER — OFFICE VISIT (OUTPATIENT)
Dept: UROLOGY | Facility: CLINIC | Age: 73
End: 2023-02-27
Payer: COMMERCIAL

## 2023-02-27 VITALS
BODY MASS INDEX: 28.31 KG/M2 | HEART RATE: 81 BPM | DIASTOLIC BLOOD PRESSURE: 86 MMHG | SYSTOLIC BLOOD PRESSURE: 146 MMHG | WEIGHT: 209 LBS | HEIGHT: 72 IN

## 2023-02-27 DIAGNOSIS — Z12.5 SCREENING FOR PROSTATE CANCER: ICD-10-CM

## 2023-02-27 DIAGNOSIS — R53.83 FATIGUE, UNSPECIFIED TYPE: ICD-10-CM

## 2023-02-27 DIAGNOSIS — E29.1 TESTICULAR HYPOGONADISM: Primary | ICD-10-CM

## 2023-02-27 PROCEDURE — 1101F PR PT FALLS ASSESS DOC 0-1 FALLS W/OUT INJ PAST YR: ICD-10-PCS | Mod: ,,, | Performed by: UROLOGY

## 2023-02-27 PROCEDURE — 3288F FALL RISK ASSESSMENT DOCD: CPT | Mod: ,,, | Performed by: UROLOGY

## 2023-02-27 PROCEDURE — 3077F SYST BP >= 140 MM HG: CPT | Mod: ,,, | Performed by: UROLOGY

## 2023-02-27 PROCEDURE — 1101F PT FALLS ASSESS-DOCD LE1/YR: CPT | Mod: ,,, | Performed by: UROLOGY

## 2023-02-27 PROCEDURE — 99213 PR OFFICE/OUTPT VISIT, EST, LEVL III, 20-29 MIN: ICD-10-PCS | Mod: S$PBB,25,, | Performed by: UROLOGY

## 2023-02-27 PROCEDURE — 3288F PR FALLS RISK ASSESSMENT DOCUMENTED: ICD-10-PCS | Mod: ,,, | Performed by: UROLOGY

## 2023-02-27 PROCEDURE — 3077F PR MOST RECENT SYSTOLIC BLOOD PRESSURE >= 140 MM HG: ICD-10-PCS | Mod: ,,, | Performed by: UROLOGY

## 2023-02-27 PROCEDURE — 3079F DIAST BP 80-89 MM HG: CPT | Mod: ,,, | Performed by: UROLOGY

## 2023-02-27 PROCEDURE — 1160F PR REVIEW ALL MEDS BY PRESCRIBER/CLIN PHARMACIST DOCUMENTED: ICD-10-PCS | Mod: ,,, | Performed by: UROLOGY

## 2023-02-27 PROCEDURE — 99215 OFFICE O/P EST HI 40 MIN: CPT | Mod: PBBFAC | Performed by: UROLOGY

## 2023-02-27 PROCEDURE — 1126F PR PAIN SEVERITY QUANTIFIED, NO PAIN PRESENT: ICD-10-PCS | Mod: ,,, | Performed by: UROLOGY

## 2023-02-27 PROCEDURE — 99213 OFFICE O/P EST LOW 20 MIN: CPT | Mod: S$PBB,25,, | Performed by: UROLOGY

## 2023-02-27 PROCEDURE — 1159F PR MEDICATION LIST DOCUMENTED IN MEDICAL RECORD: ICD-10-PCS | Mod: ,,, | Performed by: UROLOGY

## 2023-02-27 PROCEDURE — 1126F AMNT PAIN NOTED NONE PRSNT: CPT | Mod: ,,, | Performed by: UROLOGY

## 2023-02-27 PROCEDURE — 3079F PR MOST RECENT DIASTOLIC BLOOD PRESSURE 80-89 MM HG: ICD-10-PCS | Mod: ,,, | Performed by: UROLOGY

## 2023-02-27 PROCEDURE — 1159F MED LIST DOCD IN RCRD: CPT | Mod: ,,, | Performed by: UROLOGY

## 2023-02-27 PROCEDURE — 96372 THER/PROPH/DIAG INJ SC/IM: CPT | Mod: PBBFAC | Performed by: UROLOGY

## 2023-02-27 PROCEDURE — 1160F RVW MEDS BY RX/DR IN RCRD: CPT | Mod: ,,, | Performed by: UROLOGY

## 2023-02-27 RX ADMIN — TESTOSTERONE UNDECANOATE 750 MG: 250 INJECTION INTRAMUSCULAR at 09:02

## 2023-02-27 NOTE — PATIENT INSTRUCTIONS
AVEED given.  Lab reported after he left.  Testosterone of 467 is well within therapeutic range.  PSA of 0.983 is in low normal range.  Appointment in 10 weeks with next AVEED and will get another testosterone with H&H.  -----------------------------------------------------------------------------    Go by lab for PSA, Testosterone and H&H today.    Got to lab on 5/8/2023 for Testosterone and H&H  prior to visit.

## 2023-02-27 NOTE — PROGRESS NOTES
Subjective:       Patient ID: Alon Felipe is a 73 y.o. male.    Chief Complaint: Follow-up (1 week Aveed injection # 3)    Urology History  Symptoms:  No dysuria, No urethral discharge, No hematuria, No erectile dysfunction  Associated Symptoms:  No abdominal pain, No edema  History of Present Illness  This 64-year-old white male still works with [employer]. He has a history as noted in the dictation of 1/16/2012 when I last saw him. He is seen about every 2 years. He is on Flomax and Uroxatral as well as Cialis. We had to encourage him to return for followup visit.      He has not had any flare-ups with his chronic bacterial prostatitis. The patient still voids adequately then he [facility]. He takes super beta prostate in addition to the Uroxatral and Avodart.      I talked with him about Cialis. He normally takes a 20 mg dose. He is able to penetrate but he had poor quality erections. I suggested that he switch to the 5 mg dose and try two 5s for total of 10 mg, etc. I gave him a sample box and a coupon. By rectal examination, he has a 20 to 30 grams size prostate that feels benign. He understands that I plan to retire.  (December 9, 2014)  ------------------------------------------------------------------------------------------------------------------------------------------------------------  The above note is the last clinic note of Dr. Dani De La Vega     Mr. Felipe is 68 years old. He is the  of Ms. Jami Felipe. Patient been on combination therapy for bladder outlet obstruction from BPH for several years with Avodart and tamsulosin. He used to be on 5 mg Cialis but is no longer on that. He is having nocturia generally no more than one time nightly. [facility] 0.76. Patient has been on testosterone injections for several years but did not get really good results from that because of the peaks and valleys it produced from only taking once a  month. Not on anything for testicular hypogonadism at present. Voiding okay currently; has nocturia one time nightly but IPSS score is 19. (June 18, 2018)     Mr. Felipe is in for the first time in nearly one year. He had been using injectable testosterone but we switched him to testosterone cream on April 30 and he's been on 5% testosterone since. He does not think he has as much energy as he had before, but doing fairly well. He has some urinary frequency in the morning after he drinks his coffee but no nocturia. No worsening bladder outlet obstructive symptoms. Generally feeling okay. (Jerica 3, 2019)     Mr. Felipe has been doing reasonably well although his energy level is not as good as when he took testosterone shots. That is not surprising. Testosterone is pending as he had his level drawn this morning. No worsening bladder outlet obstruction. He has frequency worse in the morning after he drinks coffee but does not have nocturia but one time nightly typically. Overall satisfactory and doing okay. Desires no additional help with voiding. Serum testosterone level pending.  (Jerica 15, 2020)     PSA Results:  Past PSA Results   PSA was 0.81 on April 30, 2020  PSA was 0.76 on March 3, 2017  PSA was 0.851 on January 16, 2012  PSA was 0.706 on February 28, 2007  PSA was 1.74 on January 30, 2006  --------------------------------------------------------------------------------------------------     PSA was 0.53 on 3/18/2021  PSA was 0.425 on February 18, 2022     Mr. Felipe is in for his 1 year follow-up.  He has testicular hypogonadism and is on 9% testosterone.  Recent testosterone is barely in therapeutic range despite using his testosterone cream on a regular basis as directed.  Testosterone is lower than last year.  We can go up on concentration again.  He had a PSA in March that was 0.53.  [facility]  Had recent respiratory illness but has recovered from that.   Energy level not as good as desired but he is quite active.   (July 28, 2021)     Mr. Felipe is in for 1st visit in 1 year.  He has continued on 10% testosterone but wants to see if he can be switched to Aveed.  He would find that to be more convenient if we can get him approved.  Needs renewal of finasteride and tamsulosin.  Voiding is stable and he does not feel he needs any extra help with voiding.  He had his PSA done and it is normal at 0.52.  We will delay getting a serum testosterone until after he starts the Aveed and get it done after the 1st couple of injections. (August 9, 2022)     Mr. Felipe was approved for his Aveed.  He returned today for 1st injection.  Doing okay clinically. [September 9, 2022]     Mr. Felipe is feeling okay and feels that his energy level is satisfactory.  We did not do testosterone but so far he seems to be responding favorably to the Aveed.  He is here for injection #2 today.     Mr. Felipe is here for his 3rd Aveed and is generally pleased with the results as is fatigue is better although has had some fall off for last week or so.  Other than that has been pleased with the improvement in his clinical symptoms.  No new medical issues.  [December 19, 2022]    Mr. Felipe is here for his AVEED and lab studies including his yearly PSA screen.  Seems be doing well on the current regimen although he says toward the end of the 10 weeks he thinks he is a little more fatigued.  PSA was normal at 0.983 and serum testosterone still well within normal range of 467 after he left.  We will continue on the 10 week regimen  of AVEED and get lab studies again next time.  No new problems.  [February 27, 2023]    Review of Systems      Objective:      Physical Exam  Constitutional:       Appearance: Normal appearance. He is normal weight.   Neurological:      Mental Status: He is alert.   Psychiatric:         Mood and Affect: Mood normal.         Behavior: Behavior normal.     Urinalysis revealed  occasional pus cells.  Dipstick negative with pH 6.0 and specific gravity 1.025  Assessment:       Problem List Items Addressed This Visit    None  Visit Diagnoses       Testicular hypogonadism    -  Primary    Relevant Medications    testosterone undecanoate injection 750 mg (Completed)    Other Relevant Orders    Prior authorization Order    Testosterone    Hemoglobin and Hematocrit    Hemoglobin and Hematocrit (Completed)    Testosterone (Completed)    Fatigue, unspecified type        Relevant Medications    testosterone undecanoate injection 750 mg (Completed)    Other Relevant Orders    Prior authorization Order    Testosterone    Hemoglobin and Hematocrit    Hemoglobin and Hematocrit (Completed)    Testosterone (Completed)    Screening for prostate cancer        Relevant Orders    PSA, Screening (Completed)              Plan:     AVEED given.  Lab reported after he left.  Testosterone of 467 is well within therapeutic range.  PSA of 0.983 is in low normal range.  Appointment in 10 weeks with next AVEED and will get another testosterone with H&H.

## 2023-05-08 ENCOUNTER — OFFICE VISIT (OUTPATIENT)
Dept: UROLOGY | Facility: CLINIC | Age: 73
End: 2023-05-08
Payer: COMMERCIAL

## 2023-05-08 VITALS
BODY MASS INDEX: 28.17 KG/M2 | SYSTOLIC BLOOD PRESSURE: 147 MMHG | WEIGHT: 208 LBS | HEART RATE: 60 BPM | HEIGHT: 72 IN | DIASTOLIC BLOOD PRESSURE: 83 MMHG

## 2023-05-08 DIAGNOSIS — R53.83 FATIGUE, UNSPECIFIED TYPE: ICD-10-CM

## 2023-05-08 DIAGNOSIS — E29.1 TESTICULAR HYPOGONADISM: Primary | ICD-10-CM

## 2023-05-08 PROCEDURE — 3288F FALL RISK ASSESSMENT DOCD: CPT | Mod: ,,, | Performed by: UROLOGY

## 2023-05-08 PROCEDURE — 1159F MED LIST DOCD IN RCRD: CPT | Mod: ,,, | Performed by: UROLOGY

## 2023-05-08 PROCEDURE — 3077F PR MOST RECENT SYSTOLIC BLOOD PRESSURE >= 140 MM HG: ICD-10-PCS | Mod: ,,, | Performed by: UROLOGY

## 2023-05-08 PROCEDURE — 3077F SYST BP >= 140 MM HG: CPT | Mod: ,,, | Performed by: UROLOGY

## 2023-05-08 PROCEDURE — 99212 OFFICE O/P EST SF 10 MIN: CPT | Mod: S$PBB,,, | Performed by: UROLOGY

## 2023-05-08 PROCEDURE — 1160F RVW MEDS BY RX/DR IN RCRD: CPT | Mod: ,,, | Performed by: UROLOGY

## 2023-05-08 PROCEDURE — 1101F PR PT FALLS ASSESS DOC 0-1 FALLS W/OUT INJ PAST YR: ICD-10-PCS | Mod: ,,, | Performed by: UROLOGY

## 2023-05-08 PROCEDURE — 1160F PR REVIEW ALL MEDS BY PRESCRIBER/CLIN PHARMACIST DOCUMENTED: ICD-10-PCS | Mod: ,,, | Performed by: UROLOGY

## 2023-05-08 PROCEDURE — 99215 OFFICE O/P EST HI 40 MIN: CPT | Mod: PBBFAC | Performed by: UROLOGY

## 2023-05-08 PROCEDURE — 1159F PR MEDICATION LIST DOCUMENTED IN MEDICAL RECORD: ICD-10-PCS | Mod: ,,, | Performed by: UROLOGY

## 2023-05-08 PROCEDURE — 1101F PT FALLS ASSESS-DOCD LE1/YR: CPT | Mod: ,,, | Performed by: UROLOGY

## 2023-05-08 PROCEDURE — 4010F ACE/ARB THERAPY RXD/TAKEN: CPT | Mod: ,,, | Performed by: UROLOGY

## 2023-05-08 PROCEDURE — 3079F PR MOST RECENT DIASTOLIC BLOOD PRESSURE 80-89 MM HG: ICD-10-PCS | Mod: ,,, | Performed by: UROLOGY

## 2023-05-08 PROCEDURE — 3079F DIAST BP 80-89 MM HG: CPT | Mod: ,,, | Performed by: UROLOGY

## 2023-05-08 PROCEDURE — 1126F AMNT PAIN NOTED NONE PRSNT: CPT | Mod: ,,, | Performed by: UROLOGY

## 2023-05-08 PROCEDURE — 96372 THER/PROPH/DIAG INJ SC/IM: CPT | Mod: PBBFAC | Performed by: UROLOGY

## 2023-05-08 PROCEDURE — 4010F PR ACE/ARB THEARPY RXD/TAKEN: ICD-10-PCS | Mod: ,,, | Performed by: UROLOGY

## 2023-05-08 PROCEDURE — 99212 PR OFFICE/OUTPT VISIT, EST, LEVL II, 10-19 MIN: ICD-10-PCS | Mod: S$PBB,,, | Performed by: UROLOGY

## 2023-05-08 PROCEDURE — 1126F PR PAIN SEVERITY QUANTIFIED, NO PAIN PRESENT: ICD-10-PCS | Mod: ,,, | Performed by: UROLOGY

## 2023-05-08 PROCEDURE — 3288F PR FALLS RISK ASSESSMENT DOCUMENTED: ICD-10-PCS | Mod: ,,, | Performed by: UROLOGY

## 2023-05-08 RX ORDER — DUTASTERIDE 0.5 MG/1
0.5 CAPSULE, LIQUID FILLED ORAL DAILY
COMMUNITY
Start: 2023-04-28 | End: 2023-09-18 | Stop reason: SDUPTHER

## 2023-05-08 RX ORDER — LISINOPRIL 20 MG/1
TABLET ORAL
COMMUNITY
Start: 2022-06-14

## 2023-05-08 RX ADMIN — TESTOSTERONE UNDECANOATE 750 MG: 250 INJECTION INTRAMUSCULAR at 10:05

## 2023-05-08 NOTE — PATIENT INSTRUCTIONS
AVEED given.  Lab data reported to Mr. Felipe.  Appointment in 10 weeks for next testosterone, H&H, and next AVEED.

## 2023-05-08 NOTE — PROGRESS NOTES
Subjective     Patient ID: Alon Felipe is a 73 y.o. male.    Chief Complaint: Follow-up (AVEED injection; Testosterone, H&H)    Urology History  Symptoms:  No dysuria, No urethral discharge, No hematuria, No erectile dysfunction  Associated Symptoms:  No abdominal pain, No edema  History of Present Illness  This 64-year-old white male still works with [employer]. He has a history as noted in the dictation of 1/16/2012 when I last saw him. He is seen about every 2 years. He is on Flomax and Uroxatral as well as Cialis. We had to encourage him to return for followup visit.      He has not had any flare-ups with his chronic bacterial prostatitis. The patient still voids adequately then he [facility]. He takes super beta prostate in addition to the Uroxatral and Avodart.      I talked with him about Cialis. He normally takes a 20 mg dose. He is able to penetrate but he had poor quality erections. I suggested that he switch to the 5 mg dose and try two 5s for total of 10 mg, etc. I gave him a sample box and a coupon. By rectal examination, he has a 20 to 30 grams size prostate that feels benign. He understands that I plan to retire.  (December 9, 2014)  ------------------------------------------------------------------------------------------------------------------------------------------------------------  The above note is the last clinic note of Dr. Dani De La Vega     Mr. Felipe is 68 years old. He is the  of Ms. Jami Felipe. Patient been on combination therapy for bladder outlet obstruction from BPH for several years with Avodart and tamsulosin. He used to be on 5 mg Cialis but is no longer on that. He is having nocturia generally no more than one time nightly. [facility]. Patient has been on testosterone injections for several years but did not get really good results from that because of the peaks and valleys it produced from only taking  once a month. Not on anything for testicular hypogonadism at present. Voiding okay currently; has nocturia one time nightly but IPSS score is 19. (June 18, 2018)     Mr. Felipe is in for the first time in nearly one year. He had been using injectable testosterone but we switched him to testosterone cream on April 30 and he's been on 5% testosterone since. He does not think he has as much energy as he had before, but doing fairly well. He has some urinary frequency in the morning after he drinks his coffee but no nocturia. No worsening bladder outlet obstructive symptoms. Generally feeling okay. (Jerica 3, 2019)     Mr. Felipe has been doing reasonably well although his energy level is not as good as when he took testosterone shots. That is not surprising. Testosterone is pending as he had his level drawn this morning. No worsening bladder outlet obstruction. He has frequency worse in the morning after he drinks coffee but does not have nocturia but one time nightly typically. Overall satisfactory and doing okay. Desires no additional help with voiding. Serum testosterone level pending.  (Jerica 15, 2020)     PSA Results:  Past PSA Results   PSA was 0.81 on April 30, 2020  PSA was 0.76 on March 3, 2017  PSA was 0.851 on January 16, 2012  PSA was 0.706 on February 28, 2007  PSA was 1.74 on January 30, 2006  --------------------------------------------------------------------------------------------------     PSA was 0.53 on 3/18/2021  PSA was 0.425 on February 18, 2022     Mr. Felipe is in for his 1 year follow-up.  He has testicular hypogonadism and is on 9% testosterone.  Recent testosterone is barely in therapeutic range despite using his testosterone cream on a regular basis as directed.  Testosterone is lower than last year.  We can go up on concentration again.  He had a PSA in March that was 0.53.  [facility]  Had recent respiratory illness but has recovered from  that.  Energy level not as good as desired but he is quite active.   (July 28, 2021)     Mr. Felipe is in for 1st visit in 1 year.  He has continued on 10% testosterone but wants to see if he can be switched to Aveed.  He would find that to be more convenient if we can get him approved.  Needs renewal of finasteride and tamsulosin.  Voiding is stable and he does not feel he needs any extra help with voiding.  He had his PSA done and it is normal at 0.52.  We will delay getting a serum testosterone until after he starts the Aveed and get it done after the 1st couple of injections. (August 9, 2022)     Mr. Felipe was approved for his Aveed.  He returned today for 1st injection.  Doing okay clinically. [September 9, 2022]     Mr. Felipe is feeling okay and feels that his energy level is satisfactory.  We did not do testosterone but so far he seems to be responding favorably to the Aveed.  He is here for injection #2 today.     Mr. Felipe is here for his 3rd Aveed and is generally pleased with the results as is fatigue is better although has had some fall off for last week or so.  Other than that has been pleased with the improvement in his clinical symptoms.  No new medical issues.  [December 19, 2022]     Mr. Felipe is here for his AVEED and lab studies including his yearly PSA screen.  Seems be doing well on the current regimen although he says toward the end of the 10 weeks he thinks he is a little more fatigued.  PSA was normal at 0.983 and serum testosterone still well within normal range of 467 after he left.  We will continue on the 10 week regimen  of AVEED and get lab studies again next time.  No new problems.  [February 27, 2023]     Mr. Felipe continues to do well with his AVEED and is pleased with the results.  Serum testosterone up to 517 with satisfactory hemoglobin and hematocrit although they have gone up slightly.  No new problems.  [May 8, 2023]  Review of Systems       Objective     Physical  Exam  Constitutional:       Appearance: Normal appearance. He is normal weight.   Neurological:      Mental Status: He is alert.   Psychiatric:         Mood and Affect: Mood normal.         Behavior: Behavior normal.      Urinalysis had a few epithelial cells with occasional pus.  The dipstick had a trace of blood with pH 5.0 and specific gravity 1.030.  Assessment and Plan     Problem List Items Addressed This Visit    None  Visit Diagnoses       Testicular hypogonadism    -  Primary    Relevant Medications    testosterone undecanoate injection 750 mg (Completed)    Other Relevant Orders    Testosterone    Hemoglobin and Hematocrit    Prior authorization Order    Fatigue, unspecified type             AVEED given.  Lab data reported to Mr. Felipe.   Appointment in 10 weeks for next testosterone, H&H, and next AVEED.

## 2023-07-17 ENCOUNTER — OFFICE VISIT (OUTPATIENT)
Dept: UROLOGY | Facility: CLINIC | Age: 73
End: 2023-07-17
Payer: COMMERCIAL

## 2023-07-17 VITALS
DIASTOLIC BLOOD PRESSURE: 83 MMHG | BODY MASS INDEX: 27.5 KG/M2 | SYSTOLIC BLOOD PRESSURE: 144 MMHG | HEART RATE: 79 BPM | HEIGHT: 72 IN | WEIGHT: 203 LBS

## 2023-07-17 DIAGNOSIS — N13.8 BENIGN PROSTATIC HYPERPLASIA WITH URINARY OBSTRUCTION: ICD-10-CM

## 2023-07-17 DIAGNOSIS — R53.83 FATIGUE, UNSPECIFIED TYPE: ICD-10-CM

## 2023-07-17 DIAGNOSIS — E29.1 TESTICULAR HYPOGONADISM: Primary | ICD-10-CM

## 2023-07-17 DIAGNOSIS — N40.1 BENIGN PROSTATIC HYPERPLASIA WITH URINARY OBSTRUCTION: ICD-10-CM

## 2023-07-17 PROCEDURE — 1160F PR REVIEW ALL MEDS BY PRESCRIBER/CLIN PHARMACIST DOCUMENTED: ICD-10-PCS | Mod: ,,, | Performed by: UROLOGY

## 2023-07-17 PROCEDURE — 96372 THER/PROPH/DIAG INJ SC/IM: CPT | Mod: PBBFAC | Performed by: UROLOGY

## 2023-07-17 PROCEDURE — 1159F PR MEDICATION LIST DOCUMENTED IN MEDICAL RECORD: ICD-10-PCS | Mod: ,,, | Performed by: UROLOGY

## 2023-07-17 PROCEDURE — 1160F RVW MEDS BY RX/DR IN RCRD: CPT | Mod: ,,, | Performed by: UROLOGY

## 2023-07-17 PROCEDURE — 3288F FALL RISK ASSESSMENT DOCD: CPT | Mod: ,,, | Performed by: UROLOGY

## 2023-07-17 PROCEDURE — 3079F PR MOST RECENT DIASTOLIC BLOOD PRESSURE 80-89 MM HG: ICD-10-PCS | Mod: ,,, | Performed by: UROLOGY

## 2023-07-17 PROCEDURE — 3079F DIAST BP 80-89 MM HG: CPT | Mod: ,,, | Performed by: UROLOGY

## 2023-07-17 PROCEDURE — 4010F ACE/ARB THERAPY RXD/TAKEN: CPT | Mod: ,,, | Performed by: UROLOGY

## 2023-07-17 PROCEDURE — 3077F PR MOST RECENT SYSTOLIC BLOOD PRESSURE >= 140 MM HG: ICD-10-PCS | Mod: ,,, | Performed by: UROLOGY

## 2023-07-17 PROCEDURE — 1159F MED LIST DOCD IN RCRD: CPT | Mod: ,,, | Performed by: UROLOGY

## 2023-07-17 PROCEDURE — 4010F PR ACE/ARB THEARPY RXD/TAKEN: ICD-10-PCS | Mod: ,,, | Performed by: UROLOGY

## 2023-07-17 PROCEDURE — 99499 NO LOS: ICD-10-PCS | Mod: S$PBB,,, | Performed by: UROLOGY

## 2023-07-17 PROCEDURE — 3288F PR FALLS RISK ASSESSMENT DOCUMENTED: ICD-10-PCS | Mod: ,,, | Performed by: UROLOGY

## 2023-07-17 PROCEDURE — 99499 UNLISTED E&M SERVICE: CPT | Mod: S$PBB,,, | Performed by: UROLOGY

## 2023-07-17 PROCEDURE — 1101F PT FALLS ASSESS-DOCD LE1/YR: CPT | Mod: ,,, | Performed by: UROLOGY

## 2023-07-17 PROCEDURE — 1101F PR PT FALLS ASSESS DOC 0-1 FALLS W/OUT INJ PAST YR: ICD-10-PCS | Mod: ,,, | Performed by: UROLOGY

## 2023-07-17 PROCEDURE — 99215 OFFICE O/P EST HI 40 MIN: CPT | Mod: PBBFAC | Performed by: UROLOGY

## 2023-07-17 PROCEDURE — 3077F SYST BP >= 140 MM HG: CPT | Mod: ,,, | Performed by: UROLOGY

## 2023-07-17 RX ORDER — TESTOSTERONE UNDECANOATE 250 MG/ML
INJECTION INTRAMUSCULAR
COMMUNITY

## 2023-07-17 RX ADMIN — TESTOSTERONE UNDECANOATE 750 MG: 250 INJECTION INTRAMUSCULAR at 11:07

## 2023-07-17 NOTE — PROGRESS NOTES
Aveed Injection    Patient agreed to stay in the clinic for 30 min's after injection to monitor for side effects. Aveed IM Injection was given in left Gluteus medius upper left quadrant. No blood was noted with aspiration prior to deep IM injection of 750 mg / 3 ml testosterone undecanoate. Medicine was injected over 60 seconds pt tolerated injection well at 11:20 and monitored until 11:50 with no side effects. Pt left clinic in good condition.

## 2023-07-17 NOTE — PROGRESS NOTES
Agree with note of Mr. Moses.  Patient doing okay clinically and feeling well with less fatigue since he has been on the AVEED.  Testosterone last week down slightly to 422.

## 2023-08-11 DIAGNOSIS — N40.1 BENIGN PROSTATIC HYPERPLASIA WITH LOWER URINARY TRACT SYMPTOMS: ICD-10-CM

## 2023-08-11 RX ORDER — DUTASTERIDE 0.5 MG/1
0.5 CAPSULE, LIQUID FILLED ORAL
Qty: 30 CAPSULE | Refills: 11 | OUTPATIENT
Start: 2023-08-11

## 2023-08-11 NOTE — TELEPHONE ENCOUNTER
Received notification patient needs refill on Dutasteride, patient was called to verify this and he states he has another refill left at the pharmacy.  Mr. [facility] in [place] was called to make sure of his refill; Pharmacist said the refill had .  Verbal order given for Dutasteride 0.5 mg one capsule daily #30 with no refills at this time.  Patient has upcoming appointment on 23.

## 2023-09-18 DIAGNOSIS — N40.1 BENIGN PROSTATIC HYPERPLASIA WITH URINARY OBSTRUCTION: Primary | ICD-10-CM

## 2023-09-18 DIAGNOSIS — N13.8 BENIGN PROSTATIC HYPERPLASIA WITH URINARY OBSTRUCTION: Primary | ICD-10-CM

## 2023-09-18 RX ORDER — DUTASTERIDE 0.5 MG/1
0.5 CAPSULE, LIQUID FILLED ORAL DAILY
Qty: 90 CAPSULE | Refills: 3 | Status: SHIPPED | OUTPATIENT
Start: 2023-09-18 | End: 2024-09-12

## 2023-09-18 NOTE — TELEPHONE ENCOUNTER
Pt wife called requesting refill on Dutasteride 0.5 mg PO #90 with 3 refills. Sent to Mr [facility] at [place].

## 2023-09-25 ENCOUNTER — OFFICE VISIT (OUTPATIENT)
Dept: UROLOGY | Facility: CLINIC | Age: 73
End: 2023-09-25
Payer: COMMERCIAL

## 2023-09-25 VITALS
BODY MASS INDEX: 27.36 KG/M2 | DIASTOLIC BLOOD PRESSURE: 80 MMHG | SYSTOLIC BLOOD PRESSURE: 148 MMHG | WEIGHT: 202 LBS | HEART RATE: 71 BPM | HEIGHT: 72 IN

## 2023-09-25 DIAGNOSIS — N40.1 BENIGN PROSTATIC HYPERPLASIA WITH URINARY OBSTRUCTION: ICD-10-CM

## 2023-09-25 DIAGNOSIS — N13.8 BENIGN PROSTATIC HYPERPLASIA WITH URINARY OBSTRUCTION: ICD-10-CM

## 2023-09-25 DIAGNOSIS — E29.1 TESTICULAR HYPOFUNCTION: Primary | ICD-10-CM

## 2023-09-25 DIAGNOSIS — R53.83 FATIGUE, UNSPECIFIED TYPE: ICD-10-CM

## 2023-09-25 PROCEDURE — 1160F RVW MEDS BY RX/DR IN RCRD: CPT | Mod: S$GLB,ICN,, | Performed by: UROLOGY

## 2023-09-25 PROCEDURE — 4010F ACE/ARB THERAPY RXD/TAKEN: CPT | Mod: S$GLB,ICN,, | Performed by: UROLOGY

## 2023-09-25 PROCEDURE — 1101F PR PT FALLS ASSESS DOC 0-1 FALLS W/OUT INJ PAST YR: ICD-10-PCS | Mod: S$GLB,ICN,, | Performed by: UROLOGY

## 2023-09-25 PROCEDURE — 3077F PR MOST RECENT SYSTOLIC BLOOD PRESSURE >= 140 MM HG: ICD-10-PCS | Mod: S$GLB,ICN,, | Performed by: UROLOGY

## 2023-09-25 PROCEDURE — 3288F FALL RISK ASSESSMENT DOCD: CPT | Mod: S$GLB,ICN,, | Performed by: UROLOGY

## 2023-09-25 PROCEDURE — 3288F PR FALLS RISK ASSESSMENT DOCUMENTED: ICD-10-PCS | Mod: S$GLB,ICN,, | Performed by: UROLOGY

## 2023-09-25 PROCEDURE — 3077F SYST BP >= 140 MM HG: CPT | Mod: S$GLB,ICN,, | Performed by: UROLOGY

## 2023-09-25 PROCEDURE — 1159F MED LIST DOCD IN RCRD: CPT | Mod: S$GLB,ICN,, | Performed by: UROLOGY

## 2023-09-25 PROCEDURE — 96372 THER/PROPH/DIAG INJ SC/IM: CPT | Mod: S$GLB,ICN,, | Performed by: UROLOGY

## 2023-09-25 PROCEDURE — 3079F DIAST BP 80-89 MM HG: CPT | Mod: S$GLB,ICN,, | Performed by: UROLOGY

## 2023-09-25 PROCEDURE — 1159F PR MEDICATION LIST DOCUMENTED IN MEDICAL RECORD: ICD-10-PCS | Mod: S$GLB,ICN,, | Performed by: UROLOGY

## 2023-09-25 PROCEDURE — 1160F PR REVIEW ALL MEDS BY PRESCRIBER/CLIN PHARMACIST DOCUMENTED: ICD-10-PCS | Mod: S$GLB,ICN,, | Performed by: UROLOGY

## 2023-09-25 PROCEDURE — 4010F PR ACE/ARB THEARPY RXD/TAKEN: ICD-10-PCS | Mod: S$GLB,ICN,, | Performed by: UROLOGY

## 2023-09-25 PROCEDURE — 1101F PT FALLS ASSESS-DOCD LE1/YR: CPT | Mod: S$GLB,ICN,, | Performed by: UROLOGY

## 2023-09-25 PROCEDURE — 99499 NO LOS: ICD-10-PCS | Mod: S$GLB,,, | Performed by: UROLOGY

## 2023-09-25 PROCEDURE — 99499 UNLISTED E&M SERVICE: CPT | Mod: S$GLB,,, | Performed by: UROLOGY

## 2023-09-25 PROCEDURE — 3079F PR MOST RECENT DIASTOLIC BLOOD PRESSURE 80-89 MM HG: ICD-10-PCS | Mod: S$GLB,ICN,, | Performed by: UROLOGY

## 2023-09-25 PROCEDURE — 96372 PR INJECTION,THERAP/PROPH/DIAG2ST, IM OR SUBCUT: ICD-10-PCS | Mod: S$GLB,ICN,, | Performed by: UROLOGY

## 2023-09-25 PROCEDURE — 99213 OFFICE O/P EST LOW 20 MIN: CPT | Mod: PBBFAC | Performed by: UROLOGY

## 2023-09-25 NOTE — PATIENT INSTRUCTIONS
Aveed Injection  Patient agreed to stay in the clinic for 30 min's after injection to monitor for side effects. Aveed IM Injection was given in Gluteus medius upper right quadrant. No blood was noted with aspiration prior to deep IM injection of 750 mg / 3 ml testosterone undecanoate. Medicine was injected over 60 seconds, patient tolerated injection well at 1113 and was monitored until 1143 with no side effects. Patient left clinic in good condition.         Return in 10 weeks for next AVEED and additional lab.

## 2023-09-25 NOTE — PROGRESS NOTES
Mr. Felipe is doing okay clinically.  Testosterone last week up to 506.  He feels he is in the range he needs to be in as far as energy level etc..  No new medical problems that he is aware of. [September 25, 2023]

## 2023-12-06 ENCOUNTER — OFFICE VISIT (OUTPATIENT)
Dept: UROLOGY | Facility: CLINIC | Age: 73
End: 2023-12-06
Payer: COMMERCIAL

## 2023-12-06 VITALS
DIASTOLIC BLOOD PRESSURE: 84 MMHG | BODY MASS INDEX: 27.63 KG/M2 | HEART RATE: 76 BPM | WEIGHT: 204 LBS | HEIGHT: 72 IN | SYSTOLIC BLOOD PRESSURE: 147 MMHG

## 2023-12-06 DIAGNOSIS — N40.1 BENIGN PROSTATIC HYPERPLASIA WITH URINARY OBSTRUCTION: ICD-10-CM

## 2023-12-06 DIAGNOSIS — R53.83 FATIGUE, UNSPECIFIED TYPE: ICD-10-CM

## 2023-12-06 DIAGNOSIS — E29.1 TESTICULAR HYPOFUNCTION: ICD-10-CM

## 2023-12-06 DIAGNOSIS — E29.1 TESTICULAR HYPOGONADISM: Primary | ICD-10-CM

## 2023-12-06 DIAGNOSIS — N13.8 BENIGN PROSTATIC HYPERPLASIA WITH URINARY OBSTRUCTION: ICD-10-CM

## 2023-12-06 PROCEDURE — 99215 OFFICE O/P EST HI 40 MIN: CPT | Mod: PBBFAC | Performed by: UROLOGY

## 2023-12-06 PROCEDURE — 1159F PR MEDICATION LIST DOCUMENTED IN MEDICAL RECORD: ICD-10-PCS | Mod: S$GLB,,, | Performed by: UROLOGY

## 2023-12-06 PROCEDURE — 3077F SYST BP >= 140 MM HG: CPT | Mod: S$GLB,,, | Performed by: UROLOGY

## 2023-12-06 PROCEDURE — 1126F PR PAIN SEVERITY QUANTIFIED, NO PAIN PRESENT: ICD-10-PCS | Mod: S$GLB,,, | Performed by: UROLOGY

## 2023-12-06 PROCEDURE — 1101F PT FALLS ASSESS-DOCD LE1/YR: CPT | Mod: S$GLB,,, | Performed by: UROLOGY

## 2023-12-06 PROCEDURE — 1160F RVW MEDS BY RX/DR IN RCRD: CPT | Mod: S$GLB,,, | Performed by: UROLOGY

## 2023-12-06 PROCEDURE — 96372 PR INJECTION,THERAP/PROPH/DIAG2ST, IM OR SUBCUT: ICD-10-PCS | Mod: S$GLB,,, | Performed by: UROLOGY

## 2023-12-06 PROCEDURE — 4010F ACE/ARB THERAPY RXD/TAKEN: CPT | Mod: S$GLB,,, | Performed by: UROLOGY

## 2023-12-06 PROCEDURE — 3079F DIAST BP 80-89 MM HG: CPT | Mod: S$GLB,,, | Performed by: UROLOGY

## 2023-12-06 PROCEDURE — 4010F PR ACE/ARB THEARPY RXD/TAKEN: ICD-10-PCS | Mod: S$GLB,,, | Performed by: UROLOGY

## 2023-12-06 PROCEDURE — 96372 THER/PROPH/DIAG INJ SC/IM: CPT | Mod: S$GLB,,, | Performed by: UROLOGY

## 2023-12-06 PROCEDURE — 3288F PR FALLS RISK ASSESSMENT DOCUMENTED: ICD-10-PCS | Mod: S$GLB,,, | Performed by: UROLOGY

## 2023-12-06 PROCEDURE — 1126F AMNT PAIN NOTED NONE PRSNT: CPT | Mod: S$GLB,,, | Performed by: UROLOGY

## 2023-12-06 PROCEDURE — 3288F FALL RISK ASSESSMENT DOCD: CPT | Mod: S$GLB,,, | Performed by: UROLOGY

## 2023-12-06 PROCEDURE — 99212 OFFICE O/P EST SF 10 MIN: CPT | Mod: 25,S$GLB,, | Performed by: UROLOGY

## 2023-12-06 PROCEDURE — 3077F PR MOST RECENT SYSTOLIC BLOOD PRESSURE >= 140 MM HG: ICD-10-PCS | Mod: S$GLB,,, | Performed by: UROLOGY

## 2023-12-06 PROCEDURE — 1101F PR PT FALLS ASSESS DOC 0-1 FALLS W/OUT INJ PAST YR: ICD-10-PCS | Mod: S$GLB,,, | Performed by: UROLOGY

## 2023-12-06 PROCEDURE — 1160F PR REVIEW ALL MEDS BY PRESCRIBER/CLIN PHARMACIST DOCUMENTED: ICD-10-PCS | Mod: S$GLB,,, | Performed by: UROLOGY

## 2023-12-06 PROCEDURE — 99212 PR OFFICE/OUTPT VISIT, EST, LEVL II, 10-19 MIN: ICD-10-PCS | Mod: 25,S$GLB,, | Performed by: UROLOGY

## 2023-12-06 PROCEDURE — 3079F PR MOST RECENT DIASTOLIC BLOOD PRESSURE 80-89 MM HG: ICD-10-PCS | Mod: S$GLB,,, | Performed by: UROLOGY

## 2023-12-06 PROCEDURE — 1159F MED LIST DOCD IN RCRD: CPT | Mod: S$GLB,,, | Performed by: UROLOGY

## 2023-12-06 NOTE — PROGRESS NOTES
See note Mr. Moses. AVEED injection carried out without problems.  Lab data looks good.  Patient feeling well and feels therapy totally successful. [December 6, 2023]

## 2023-12-06 NOTE — PATIENT INSTRUCTIONS
AVEED given as described.  Return in 10 weeks for next AVEED, along with serum testosterone and H&H.

## 2023-12-06 NOTE — PROGRESS NOTES
Aveed Injection    A Pt. Guide was given and Pt agreed to stay in the clinic for 30 min's after injection to monitor for side effects. Aveed IM Injection was given in Left Gluteus medius upper lateral quadrant. No blood was noted with aspiration prior to deep IM injection of 750 mg / 3 ml testosterone undecanoate. Medicine was injected over 60 seconds pt tolerated injection well at 1200 and monitored until 1230 with no side effects. Pt left clinic in good condition.

## 2024-01-19 ENCOUNTER — OFFICE VISIT (OUTPATIENT)
Dept: ORTHOPEDICS | Facility: CLINIC | Age: 74
End: 2024-01-19
Payer: COMMERCIAL

## 2024-01-19 VITALS — HEIGHT: 72 IN | WEIGHT: 204 LBS | BODY MASS INDEX: 27.63 KG/M2

## 2024-01-19 DIAGNOSIS — S63.502A WRIST SPRAIN, LEFT, INITIAL ENCOUNTER: ICD-10-CM

## 2024-01-19 DIAGNOSIS — M75.52 BURSITIS OF LEFT SHOULDER: Primary | ICD-10-CM

## 2024-01-19 PROCEDURE — 99203 OFFICE O/P NEW LOW 30 MIN: CPT | Mod: 25,S$GLB,, | Performed by: ORTHOPAEDIC SURGERY

## 2024-01-19 PROCEDURE — 99214 OFFICE O/P EST MOD 30 MIN: CPT | Mod: PBBFAC | Performed by: ORTHOPAEDIC SURGERY

## 2024-01-19 PROCEDURE — 20610 DRAIN/INJ JOINT/BURSA W/O US: CPT | Mod: LT,S$GLB,, | Performed by: ORTHOPAEDIC SURGERY

## 2024-01-19 PROCEDURE — 1159F MED LIST DOCD IN RCRD: CPT | Mod: S$GLB,,, | Performed by: ORTHOPAEDIC SURGERY

## 2024-01-19 PROCEDURE — 20610 DRAIN/INJ JOINT/BURSA W/O US: CPT | Mod: PBBFAC,LT | Performed by: ORTHOPAEDIC SURGERY

## 2024-01-19 RX ADMIN — TRIAMCINOLONE ACETONIDE 40 MG: 40 INJECTION, SUSPENSION INTRA-ARTICULAR; INTRAMUSCULAR at 10:01

## 2024-01-19 RX ADMIN — BUPIVACAINE HYDROCHLORIDE 1 ML: 2.5 INJECTION, SOLUTION EPIDURAL; INFILTRATION; INTRACAUDAL at 10:01

## 2024-01-19 NOTE — PROGRESS NOTES
Clinic Note        CC:   Chief Complaint   Patient presents with    Left Forearm - Pain    Left Upper Arm - Pain        Principal problem: Bursitis of left shoulder [M75.52]     REASON FOR VISIT:       HISTORY:  74-year-old male who sustained a fall in the ice onto his left side he is having pain over his left wrist in his over his left shoulder he said it 1st he was able to bring his arm up over his head but the next day started having pain in the shoulder he has had pain over his scaphoid in his wrist.  His x-rays are negative for fractures.      PAST MEDICAL HISTORY:   Past Medical History:   Diagnosis Date    Acute superficial gastritis without hemorrhage 9/7/2022    Enlarged prostate     Hypertension     Stroke           PAST SURGICAL HISTORY:   Past Surgical History:   Procedure Laterality Date    BACK SURGERY            ALLERGIES:   Review of patient's allergies indicates:   Allergen Reactions    Iodine and iodide containing products Itching and Swelling        MEDICATIONS :    Current Outpatient Medications:     acyclovir (ZOVIRAX) 400 MG tablet, Take 2 tablets (800 mg total) by mouth 4 (four) times daily., Disp: 40 tablet, Rfl: 1    albuterol (VENTOLIN HFA) 90 mcg/actuation inhaler, Inhale 2 puffs into the lungs every 6 (six) hours as needed for Wheezing. Rescue (Patient not taking: No sig reported), Disp: 8 g, Rfl: 0    amLODIPine (NORVASC) 10 MG tablet, Take 10 mg by mouth daily as needed., Disp: , Rfl:     aspirin (ECOTRIN) 81 MG EC tablet, Take 81 mg by mouth once daily., Disp: , Rfl:     benzonatate (TESSALON) 100 MG capsule, Take 1 capsule (100 mg total) by mouth 3 (three) times daily as needed for Cough. (Patient not taking: Reported on 7/26/2021), Disp: 20 capsule, Rfl: 0    cyanocobalamin 1,000 mcg/mL injection, INJECT 1ML INTRAMUSCULARLY MONTHLY FOR ANEMIA, Disp: , Rfl:     dutasteride (AVODART) 0.5 mg capsule, Take 1 capsule (0.5 mg total) by mouth once daily., Disp: 90 capsule, Rfl:  3    fluticasone propionate (FLONASE) 50 mcg/actuation nasal spray, 1 spray (50 mcg total) by Each Nostril route once daily., Disp: 16 g, Rfl: 1    gabapentin (NEURONTIN) 400 MG capsule, Take 1 capsule (400 mg total) by mouth 3 (three) times daily., Disp: 30 capsule, Rfl: 1    lisinopriL (PRINIVIL,ZESTRIL) 20 MG tablet, 1 tablet Orally Once a day for 30 day(s), Disp: , Rfl:     montelukast (SINGULAIR) 10 mg tablet, TAKE 1 TABLET BY MOUTH EVERY DAY on empty stomach FOR allergies, Disp: , Rfl:     olmesartan (BENICAR) 20 MG tablet, Take 1 tablet (20 mg total) by mouth once daily. (Patient not taking: Reported on 5/8/2023), Disp: 90 tablet, Rfl: 3    omeprazole (PRILOSEC) 20 MG capsule, Take 20 mg by mouth once daily., Disp: , Rfl:     predniSONE (DELTASONE) 20 MG tablet, Take 1 tablet (20 mg total) by mouth once daily. (Patient not taking: Reported on 9/9/2022), Disp: 7 tablet, Rfl: 1    predniSONE (DELTASONE) 20 MG tablet, Take 1 tablet (20 mg total) by mouth once daily. (Patient not taking: Reported on 9/9/2022), Disp: 7 tablet, Rfl: 1    sildenafiL (VIAGRA) 100 MG tablet, TAKE ONE TABLET BY MOUTH ONE-TIME ONE HOUR BEFORE SEXUAL ACTIVITY (MUST NOT TAKE NITROGLYCERIN-TYPE DRUGS WITH SILDENAFIL) CAN BE TAKEN WITHOUT REGARD TO MEALS., Disp: , Rfl:     simvastatin (ZOCOR) 20 MG tablet, Take 20 mg by mouth every evening., Disp: , Rfl:     tamsulosin (FLOMAX) 0.4 mg Cap, TAKE ONE CAPSULE BY MOUTH DAILY FOR URINATION. TAKE 30 MINUTES AFTER EVENING MEAL.  REPLACES ALFUZOSIN., Disp: 90 capsule, Rfl: 3    testosterone undecanoate (AVEED) 750 mg/3 mL (250 mg/mL) injection, as directed Intramuscular once every 10 weeks, Disp: , Rfl:      SOCIAL HISTORY:   Social History     Socioeconomic History    Marital status:    Tobacco Use    Smoking status: Never    Smokeless tobacco: Never   Substance and Sexual Activity    Alcohol use: Not Currently    Drug use: Never   Social History Narrative    ** Merged History Encounter **           Social Determinants of Health     Stress: No Stress Concern Present (2/3/2022)    [employer] - Occupational Stress Questionnaire     Feeling of Stress : Not at all          FAMILY HISTORY:   Family History   Problem Relation Age of Onset    Heart failure Mother     Cancer Father           PHYSICAL EXAM:  In general, this is a well-developed, well-nourished male . The patient is alert, oriented and cooperative.      HEENT:  Normocephalic, atraumatic.  Extraocular movements are intact bilaterally.  The oropharynx is benign.       NECK:  Nontender with good range of motion.      LUNGS:  Clear to auscultation bilaterally.      HEART:  Demonstrates a regular rate and rhythm.  No murmurs appreciated.      ABDOMEN:  Soft, non-tender, non-distended.        EXTREMITIES:  Left upper extremity moves his fingers he has motor function 5/5 distally sensation to touch has palpable pulses.  He is tender palpation over his scaphoid tubercle.  Has little bit of swelling in the wrist.  No crepitus noted.  On left shoulder he is tender over his anterolateral acromion.  He has pain on impingement testing.  Negative sulcus test.      RADIOGRAPHIC FINDINGS:  X-rays of the humeral head with a from an outside facility show no fractures or subluxations left shoulder      IMPRESSION: Bursitis of left shoulder    Wrist sprain, left, initial encounter         PLAN:  Patient has a questionable scaphoid injury his x-rays were negative put him in a thumb spica splint.  He has bursitis of the shoulder.  I injected his shoulder with 1 cc of Marcaine 1 cc Kenalog.  I will let him use his arm as tolerates.  I will follow back up in proximally 6 weeks.  There are no Patient Instructions on file for this visit.      Follow up in about 6 weeks (around 3/1/2024).         Alon Acevedo      (Subject to voice recognition error, transcription service not allowed)

## 2024-02-08 ENCOUNTER — TELEPHONE (OUTPATIENT)
Dept: UROLOGY | Facility: CLINIC | Age: 74
End: 2024-02-08
Payer: COMMERCIAL

## 2024-02-08 NOTE — TELEPHONE ENCOUNTER
----- Message from Breonna Gallo sent at 2/8/2024  9:23 AM CST -----  Regarding: RESCHEDULE  PATIENT CALLING TO RESCHEDULE HIS APPOINTMENT FOR 02/14/24, CALL BACK NUMBER [phone]    Patient was called to see when he could be moved to, he states he could come on the 15th.   He asked about getting his PSA screening drawn also, however the last PSA screening was drawn on 2/27/23 therefore it is not due at this time.   Patient was rescheduled for his next AVEED injection and testosterone and H&H for Thursday 2/15/24 at 3:15, he was notified and verbalized understanding.

## 2024-02-15 ENCOUNTER — OFFICE VISIT (OUTPATIENT)
Dept: UROLOGY | Facility: CLINIC | Age: 74
End: 2024-02-15
Payer: COMMERCIAL

## 2024-02-15 VITALS
HEART RATE: 101 BPM | DIASTOLIC BLOOD PRESSURE: 89 MMHG | HEIGHT: 72 IN | WEIGHT: 203 LBS | BODY MASS INDEX: 27.5 KG/M2 | SYSTOLIC BLOOD PRESSURE: 136 MMHG

## 2024-02-15 DIAGNOSIS — N13.8 BENIGN PROSTATIC HYPERPLASIA WITH URINARY OBSTRUCTION: ICD-10-CM

## 2024-02-15 DIAGNOSIS — R53.83 FATIGUE, UNSPECIFIED TYPE: ICD-10-CM

## 2024-02-15 DIAGNOSIS — E29.1 TESTICULAR HYPOFUNCTION: Primary | ICD-10-CM

## 2024-02-15 DIAGNOSIS — N40.1 BENIGN PROSTATIC HYPERPLASIA WITH URINARY OBSTRUCTION: ICD-10-CM

## 2024-02-15 PROCEDURE — 3288F FALL RISK ASSESSMENT DOCD: CPT | Mod: S$GLB,,, | Performed by: UROLOGY

## 2024-02-15 PROCEDURE — 1160F RVW MEDS BY RX/DR IN RCRD: CPT | Mod: S$GLB,,, | Performed by: UROLOGY

## 2024-02-15 PROCEDURE — 3075F SYST BP GE 130 - 139MM HG: CPT | Mod: S$GLB,,, | Performed by: UROLOGY

## 2024-02-15 PROCEDURE — 96372 THER/PROPH/DIAG INJ SC/IM: CPT | Mod: S$GLB,,, | Performed by: UROLOGY

## 2024-02-15 PROCEDURE — 3079F DIAST BP 80-89 MM HG: CPT | Mod: S$GLB,,, | Performed by: UROLOGY

## 2024-02-15 PROCEDURE — 99215 OFFICE O/P EST HI 40 MIN: CPT | Mod: PBBFAC | Performed by: UROLOGY

## 2024-02-15 PROCEDURE — 1159F MED LIST DOCD IN RCRD: CPT | Mod: S$GLB,,, | Performed by: UROLOGY

## 2024-02-15 PROCEDURE — 1126F AMNT PAIN NOTED NONE PRSNT: CPT | Mod: S$GLB,,, | Performed by: UROLOGY

## 2024-02-15 PROCEDURE — 99212 OFFICE O/P EST SF 10 MIN: CPT | Mod: 25,S$GLB,, | Performed by: UROLOGY

## 2024-02-15 PROCEDURE — 1101F PT FALLS ASSESS-DOCD LE1/YR: CPT | Mod: S$GLB,,, | Performed by: UROLOGY

## 2024-02-15 NOTE — PROGRESS NOTES
Aveed Injection    A Pt. Guide was given and Pt agreed to stay in the clinic for 30 min's after injection to monitor for side effects. Aveed IM Injection was given in right Gluteus medius upper lateral quadrant. No blood was noted with aspiration prior to deep IM injection of 750 mg / 3 ml testosterone undecanoate. Medicine was injected over 60 seconds pt tolerated injection well at 1603 and monitored until 1633 with no side effects. Pt left clinic in good condition.

## 2024-02-16 RX ORDER — TRIAMCINOLONE ACETONIDE 40 MG/ML
40 INJECTION, SUSPENSION INTRA-ARTICULAR; INTRAMUSCULAR
Status: SHIPPED | OUTPATIENT
Start: 2024-01-19

## 2024-02-16 RX ORDER — BUPIVACAINE HYDROCHLORIDE 2.5 MG/ML
1 INJECTION, SOLUTION EPIDURAL; INFILTRATION; INTRACAUDAL
Status: SHIPPED | OUTPATIENT
Start: 2024-01-19

## 2024-02-16 NOTE — PROGRESS NOTES
Patient in for follow-up of testicular hypogonadism and to receive AVEED.    Serum testosterone 591    H/H was 17.3 g/50.2%    See note of Mr. Moses.

## 2024-02-16 NOTE — PROCEDURES
Large Joint Aspiration/Injection: L subacromial bursa    Date/Time: 1/19/2024 10:40 AM    Performed by: Alon Acevedo MD  Authorized by: Alon Acevedo MD    Consent Done?:  Yes (Verbal)  Indications:  Pain    Details:  Needle Size:  22 G  Ultrasonic Guidance for needle placement?: No    Approach:  Posterior  Location:  Shoulder  Site:  L subacromial bursa  Medications:  1 mL BUPivacaine (PF) 0.25% (2.5 mg/ml) 0.25 % (2.5 mg/mL); 40 mg triamcinolone acetonide 40 mg/mL  Patient tolerance:  Patient tolerated the procedure well with no immediate complications

## 2024-04-22 DIAGNOSIS — Z12.5 SCREENING PSA (PROSTATE SPECIFIC ANTIGEN): Primary | ICD-10-CM

## 2024-04-25 ENCOUNTER — OFFICE VISIT (OUTPATIENT)
Dept: UROLOGY | Facility: CLINIC | Age: 74
End: 2024-04-25
Payer: COMMERCIAL

## 2024-04-25 VITALS
HEART RATE: 93 BPM | HEIGHT: 72 IN | WEIGHT: 202 LBS | DIASTOLIC BLOOD PRESSURE: 93 MMHG | SYSTOLIC BLOOD PRESSURE: 144 MMHG | BODY MASS INDEX: 27.36 KG/M2

## 2024-04-25 DIAGNOSIS — N40.1 BENIGN PROSTATIC HYPERPLASIA WITH URINARY OBSTRUCTION: ICD-10-CM

## 2024-04-25 DIAGNOSIS — N13.8 BENIGN PROSTATIC HYPERPLASIA WITH URINARY OBSTRUCTION: ICD-10-CM

## 2024-04-25 DIAGNOSIS — Z12.5 SCREENING PSA (PROSTATE SPECIFIC ANTIGEN): ICD-10-CM

## 2024-04-25 DIAGNOSIS — N40.0 BENIGN PROSTATIC HYPERPLASIA, UNSPECIFIED WHETHER LOWER URINARY TRACT SYMPTOMS PRESENT: ICD-10-CM

## 2024-04-25 DIAGNOSIS — E29.1 TESTICULAR HYPOFUNCTION: Primary | ICD-10-CM

## 2024-04-25 DIAGNOSIS — R53.83 FATIGUE, UNSPECIFIED TYPE: ICD-10-CM

## 2024-04-25 PROCEDURE — 99215 OFFICE O/P EST HI 40 MIN: CPT | Mod: PBBFAC | Performed by: UROLOGY

## 2024-04-25 PROCEDURE — 3080F DIAST BP >= 90 MM HG: CPT | Mod: S$GLB,,, | Performed by: UROLOGY

## 2024-04-25 PROCEDURE — 1126F AMNT PAIN NOTED NONE PRSNT: CPT | Mod: S$GLB,,, | Performed by: UROLOGY

## 2024-04-25 PROCEDURE — 3077F SYST BP >= 140 MM HG: CPT | Mod: S$GLB,,, | Performed by: UROLOGY

## 2024-04-25 PROCEDURE — 99213 OFFICE O/P EST LOW 20 MIN: CPT | Mod: 25,S$GLB,, | Performed by: UROLOGY

## 2024-04-25 PROCEDURE — 3288F FALL RISK ASSESSMENT DOCD: CPT | Mod: S$GLB,,, | Performed by: UROLOGY

## 2024-04-25 PROCEDURE — 1159F MED LIST DOCD IN RCRD: CPT | Mod: S$GLB,,, | Performed by: UROLOGY

## 2024-04-25 PROCEDURE — 96372 THER/PROPH/DIAG INJ SC/IM: CPT | Mod: S$GLB,,, | Performed by: UROLOGY

## 2024-04-25 PROCEDURE — 1101F PT FALLS ASSESS-DOCD LE1/YR: CPT | Mod: S$GLB,,, | Performed by: UROLOGY

## 2024-04-25 RX ORDER — TADALAFIL 5 MG/1
5 TABLET ORAL DAILY
Qty: 30 TABLET | Refills: 11 | Status: SHIPPED | OUTPATIENT
Start: 2024-04-25 | End: 2025-04-25

## 2024-04-25 RX ORDER — DUTASTERIDE 0.5 MG/1
0.5 CAPSULE, LIQUID FILLED ORAL DAILY
Qty: 90 CAPSULE | Refills: 3 | Status: SHIPPED | OUTPATIENT
Start: 2024-04-25 | End: 2025-04-20

## 2024-04-25 RX ORDER — TAMSULOSIN HYDROCHLORIDE 0.4 MG/1
CAPSULE ORAL
Qty: 90 CAPSULE | Refills: 3 | Status: SHIPPED | OUTPATIENT
Start: 2024-04-25

## 2024-04-25 NOTE — PROGRESS NOTES
Aveed Injection    A Pt. Guide was given and Pt agreed to stay in the clinic for 30 min's after injection to monitor for side effects. Aveed IM Injection was given in left Gluteus medius upper lateral quadrant. No blood was noted with aspiration prior to deep IM injection of 750 mg / 3 ml testosterone undecanoate. Medicine was injected over 60 seconds pt tolerated injection well at 1556 and monitored until 1524 with no side effects. Pt left clinic in good condition.

## 2024-04-25 NOTE — PROGRESS NOTES
Subjective     Patient ID: Alon Felipe is a 74 y.o. male.    Chief Complaint: Low Testosterone (10 week Aveed injection, H&H and testosterone and office visit)    Chief Complaint: Follow-up (AVEED injection; Testosterone, H&H)     Urology History  Symptoms:  No dysuria, No urethral discharge, No hematuria, No erectile dysfunction  Associated Symptoms:  No abdominal pain, No edema  History of Present Illness  This 64-year-old white male still works with [employer]. He has a history as noted in the dictation of 1/16/2012 when I last saw him. He is seen about every 2 years. He is on Flomax and Uroxatral as well as Cialis. We had to encourage him to return for followup visit.      He has not had any flare-ups with his chronic bacterial prostatitis. The patient still voids adequately then he [facility]. He takes super beta prostate in addition to the Uroxatral and Avodart.      I talked with him about Cialis. He normally takes a 20 mg dose. He is able to penetrate but he had poor quality erections. I suggested that he switch to the 5 mg dose and try two 5s for total of 10 mg, etc. I gave him a sample box and a coupon. By rectal examination, he has a 20 to 30 grams size prostate that feels benign. He understands that I plan to retire.  (December 9, 2014)  ------------------------------------------------------------------------------------------------------------------------------------------------------------  The above note is the last clinic note of Dr. Dani De La Vega     Mr. Felipe is 68 years old. He is the  of Ms. Jami Felipe. Patient been on combination therapy for bladder outlet obstruction from BPH for several years with Avodart and tamsulosin. He used to be on 5 mg Cialis but is no longer on that. He is having nocturia generally no more than one time nightly. [facility] 0.76. Patient has been on testosterone injections for several years but  did not get really good results from that because of the peaks and valleys it produced from only taking once a month. Not on anything for testicular hypogonadism at present. Voiding okay currently; has nocturia one time nightly but IPSS score is 19. (June 18, 2018)     Mr. Felipe is in for the first time in nearly one year. He had been using injectable testosterone but we switched him to testosterone cream on April 30 and he's been on 5% testosterone since. He does not think he has as much energy as he had before, but doing fairly well. He has some urinary frequency in the morning after he drinks his coffee but no nocturia. No worsening bladder outlet obstructive symptoms. Generally feeling okay. (Jerica 3, 2019)     Mr. Felipe has been doing reasonably well although his energy level is not as good as when he took testosterone shots. That is not surprising. Testosterone is pending as he had his level drawn this morning. No worsening bladder outlet obstruction. He has frequency worse in the morning after he drinks coffee but does not have nocturia but one time nightly typically. Overall satisfactory and doing okay. Desires no additional help with voiding. Serum testosterone level pending.  (Jerica 15, 2020)     PSA Results:  Past PSA Results   PSA was 0.81 on April 30, 2020  PSA was 0.76 on March 3, 2017  PSA was 0.851 on January 16, 2012  PSA was 0.706 on February 28, 2007  PSA was 1.74 on January 30, 2006  --------------------------------------------------------------------------------------------------     PSA was 0.53 on 3/18/2021  PSA was 0.425 on February 18, 2022  PSA was 1.390 on April 23, 2024     Mr. Felipe is in for his 1 year follow-up.  He has testicular hypogonadism and is on 9% testosterone.  Recent testosterone is barely in therapeutic range despite using his testosterone cream on a regular basis as directed.  Testosterone is lower than last year.  We can go up on concentration again.  He had a PSA in March  that was 0.53.  [facility]  Had recent respiratory illness but has recovered from that.  Energy level not as good as desired but he is quite active.   (July 28, 2021)     Mr. Felipe is in for 1st visit in 1 year.  He has continued on 10% testosterone but wants to see if he can be switched to Aveed.  He would find that to be more convenient if we can get him approved.  Needs renewal of finasteride and tamsulosin.  Voiding is stable and he does not feel he needs any extra help with voiding.  He had his PSA done and it is normal at 0.52.  We will delay getting a serum testosterone until after he starts the Aveed and get it done after the 1st couple of injections. (August 9, 2022)     Mr. Felipe was approved for his Aveed.  He returned today for 1st injection.  Doing okay clinically. [September 9, 2022]     Mr. Felipe is feeling okay and feels that his energy level is satisfactory.  We did not do testosterone but so far he seems to be responding favorably to the Aveed.  He is here for injection #2 today.     Mr. Felipe is here for his 3rd Aveed and is generally pleased with the results as is fatigue is better although has had some fall off for last week or so.  Other than that has been pleased with the improvement in his clinical symptoms.  No new medical issues.  [December 19, 2022]     Mr. Felipe is here for his AVEED and lab studies including his yearly PSA screen.  Seems be doing well on the current regimen although he says toward the end of the 10 weeks he thinks he is a little more fatigued.  PSA was normal at 0.983 and serum testosterone still well within normal range of 467 after he left.  We will continue on the 10 week regimen  of AVEED and get lab studies again next time.  No new problems.  [February 27, 2023]     Mr. Felipe continues to do well with his AVEED and is pleased with the results.  Serum testosterone up to 517 with satisfactory hemoglobin and  hematocrit although they have gone up slightly.  No new problems.  [May 8, 2023]    Agree with note Mr. Moses.  Patient here primarily for AVEED.  He continues to do well with good energy level and good response to the medication.  Lab work looks good..  Serum testosterone was 481; hemoglobin/hematocrit 16.6 g/49.1%; PSA was 1.390    Review of Systems       Objective     Physical Exam  Constitutional:       Appearance: Normal appearance. He is normal weight.   Genitourinary:     Comments: Patient declined rectal exam.  He is aware of normal PSA.  Neurological:      Mental Status: He is alert.   Psychiatric:         Mood and Affect: Mood normal.         Behavior: Behavior normal.     Urinalysis unremarkable with only occasional pus cells.  The dipstick negative with pH 5.0 and specific gravity 1.025     Assessment and Plan     1. Testicular hypofunction  -     testosterone undecanoate injection 750 mg  -     Cancel: Testosterone; Future; Expected date: 07/08/2024  -     Cancel: Hemoglobin and Hematocrit; Future; Expected date: 07/08/2024  -     Prior authorization Order  -     Testosterone; Future; Expected date: 07/08/2024  -     Hemoglobin and Hematocrit; Future; Expected date: 07/08/2024    2. Benign prostatic hyperplasia with urinary obstruction  -     dutasteride (AVODART) 0.5 mg capsule; Take 1 capsule (0.5 mg total) by mouth once daily.  Dispense: 90 capsule; Refill: 3  -     tadalafiL (CIALIS) 5 MG tablet; Take 1 tablet (5 mg total) by mouth once daily.  Dispense: 30 tablet; Refill: 11    3. Benign prostatic hyperplasia, unspecified whether lower urinary tract symptoms present  -     tamsulosin (FLOMAX) 0.4 mg Cap; TAKE ONE CAPSULE BY MOUTH DAILY FOR URINATION. TAKE 30 MINUTES AFTER EVENING MEAL.  REPLACES ALFUZOSIN.  Dispense: 90 capsule; Refill: 3    4. Fatigue, unspecified type  -     testosterone undecanoate injection 750 mg  -     Prior authorization Order    5. Screening PSA (prostate specific  antigen)    Lab data reported to Mr. Felipe.  Medications renewed.  AVEED given as described.  Mr. Felipe understands I plan to retire this summer.  Appointment with Mr. Negron in 10 weeks with serum testosterone and H&H and for next AVEED.          Follow up in 2 months (on 7/8/2024) for 10 week aveed injection and labs with Maicol Negron at 11:30.

## 2024-04-25 NOTE — PATIENT INSTRUCTIONS
Lab data reported to Mr. Felipe.  Medications renewed.  AVEED given as described.  Mr. Felipe understands I plan to retire this summer.  Appointment with  Jamil in 10 weeks with serum testosterone and H&H and for next AVEED.        Please go by the lab prior to office visit with Maicol Negron NP at 11:30

## 2024-07-03 ENCOUNTER — TELEPHONE (OUTPATIENT)
Dept: UROLOGY | Facility: CLINIC | Age: 74
End: 2024-07-03
Payer: COMMERCIAL

## 2024-07-03 NOTE — PROGRESS NOTES
Subjective     Patient ID: Alon Felipe is a 74 y.o. male.    Chief Complaint: No chief complaint on file.    [facility] Complaint: Follow-up (AVEED injection; Testosterone, H&H)     Urology History  Symptoms:  No dysuria, No urethral discharge, No hematuria, No erectile dysfunction  Associated Symptoms:  No abdominal pain, No edema  History of Present Illness  This 64-year-old white male still works with [employer]. He has a history as noted in the dictation of 1/16/2012 when I last saw him. He is seen about every 2 years. He is on Flomax and Uroxatral as well as Cialis. We had to encourage him to return for followup visit.      He has not had any flare-ups with his chronic bacterial prostatitis. The patient still voids adequately then he [facility]. He takes super beta prostate in addition to the Uroxatral and Avodart.      I talked with him about Cialis. He normally takes a 20 mg dose. He is able to penetrate but he had poor quality erections. I suggested that he switch to the 5 mg dose and try two 5s for total of 10 mg, etc. I gave him a sample box and a coupon. By rectal examination, he has a 20 to 30 grams size prostate that feels benign. He understands that I plan to retire.  (December 9, 2014)  ------------------------------------------------------------------------------------------------------------------------------------------------------------  The above note is the last clinic note of Dr. Dani De La Vega     Mr. Felipe is 68 years old. He is the  of Ms. Jami Felipe. Patient been on combination therapy for bladder outlet obstruction from BPH for several years with Avodart and tamsulosin. He used to be on 5 mg Cialis but is no longer on that. He is having nocturia generally no more than one time nightly. [facility] 0.76. Patient has been on testosterone injections for several years but did not get really good results from that because of  the peaks and valleys it produced from only taking once a month. Not on anything for testicular hypogonadism at present. Voiding okay currently; has nocturia one time nightly but IPSS score is 19. (June 18, 2018)     Mr. Felipe is in for the first time in nearly one year. He had been using injectable testosterone but we switched him to testosterone cream on April 30 and he's been on 5% testosterone since. He does not think he has as much energy as he had before, but doing fairly well. He has some urinary frequency in the morning after he drinks his coffee but no nocturia. No worsening bladder outlet obstructive symptoms. Generally feeling okay. (Jerica 3, 2019)     Mr. Felipe has been doing reasonably well although his energy level is not as good as when he took testosterone shots. That is not surprising. Testosterone is pending as he had his level drawn this morning. No worsening bladder outlet obstruction. He has frequency worse in the morning after he drinks coffee but does not have nocturia but one time nightly typically. Overall satisfactory and doing okay. Desires no additional help with voiding. Serum testosterone level pending.  (Jerica 15, 2020)     PSA Results:  Past PSA Results   PSA was 0.81 on April 30, 2020  PSA was 0.76 on March 3, 2017  PSA was 0.851 on January 16, 2012  PSA was 0.706 on February 28, 2007  PSA was 1.74 on January 30, 2006  --------------------------------------------------------------------------------------------------     PSA was 0.53 on 3/18/2021  PSA was 0.425 on February 18, 2022  PSA was 1.390 on April 23, 2024     Mr. Felipe is in for his 1 year follow-up.  He has testicular hypogonadism and is on 9% testosterone.  Recent testosterone is barely in therapeutic range despite using his testosterone cream on a regular basis as directed.  Testosterone is lower than last year.  We can go up on concentration again.  He had a PSA in March that was 0.53.  [facility]  Had recent respiratory illness but has recovered from that.  Energy level not as good as desired but he is quite active.   (July 28, 2021)     Mr. Felipe is in for 1st visit in 1 year.  He has continued on 10% testosterone but wants to see if he can be switched to Aveed.  He would find that to be more convenient if we can get him approved.  Needs renewal of finasteride and tamsulosin.  Voiding is stable and he does not feel he needs any extra help with voiding.  He had his PSA done and it is normal at 0.52.  We will delay getting a serum testosterone until after he starts the Aveed and get it done after the 1st couple of injections. (August 9, 2022)     Mr. Felipe was approved for his Aveed.  He returned today for 1st injection.  Doing okay clinically. [September 9, 2022]     Mr. Felipe is feeling okay and feels that his energy level is satisfactory.  We did not do testosterone but so far he seems to be responding favorably to the Aveed.  He is here for injection #2 today.     Mr. Felipe is here for his 3rd Aveed and is generally pleased with the results as is fatigue is better although has had some fall off for last week or so.  Other than that has been pleased with the improvement in his clinical symptoms.  No new medical issues.  [December 19, 2022]     Mr. Felipe is here for his AVEED and lab studies including his yearly PSA screen.  Seems be doing well on the current regimen although he says toward the end of the 10 weeks he thinks he is a little more fatigued.  PSA was normal at 0.983 and serum testosterone still well within normal range of 467 after he left.  We will continue on the 10 week regimen  of AVEED and get lab studies again next time.  No new problems.  [February 27, 2023]     Mr. Felipe continues to do well with his AVEED and is pleased with the results.  Serum testosterone up to 517 with satisfactory hemoglobin and hematocrit although they have gone up slightly.  No new  problems.  [May 8, 2023]     Agree with note Mr. Moses.  Patient here primarily for AVEED.  He continues to do well with good energy level and good response to the medication.  Lab work looks good..  Serum testosterone was 481; hemoglobin/hematocrit 16.6 g/49.1%; PSA was 1.390  ---------------------------------------------------------------------------------------------------------------------------------------------------------------------------------------------------------------------------------------------------------------------------------------------------------    The  above notes are from Dr. JENA Zepeda in the EMR system     This pleasant 74 year old male presents to the clinic for his next Aveed injection.  He is a previous patient of Dr. JENA Zepeda treated for testicular hypofunction and BPH with obstruction.  He states he is doing good today.  He denies any new Urological problem.  He is pleased with the treatment of the testicular hypofunction and BPH with obstruction.  He desires to continue the current management of both.  He is on the Aveed injections 750 mg IM one shot every 10 weeks.  He is also on Flomax 0.4 mg and Cialis 5 mg and is tolerating these medications without side effects.  He denies dysuria, hematuria or voiding complaints today.  He denies fever, chills, nausea or vomiting. He denies any  pains. His H&H was 16.8/50.6 on July 3, 2024 and this is WNL.  His testosterone level was 654 on July 3, 2024 and this is WNL.  He understands that he will need to wait 30 minutes after the Aveed injection before leaving.   We will give the Aveed injection today.  He will continue the Flomax and Cialis as prescribed.  He will get the H&H and Testosterone level before the next visit.  I will see him back in the clinic in 10 weeks for the next Aveed treatment and labs.  I discussed the plan in detail with the patient and he is in agreement with the plan. All his questions were answered at today's visit.    ---------------------------------------------------------------------------------------------------------------------------  [July 8, 2024].         Review of Systems   Constitutional:  Negative for activity change and fever.   HENT:  Negative for hearing loss and trouble swallowing.    Eyes:  Negative for visual disturbance.   Respiratory:  Negative for cough, shortness of breath and wheezing.    Cardiovascular:  Negative for chest pain.   Gastrointestinal:  Negative for abdominal pain, diarrhea, nausea and vomiting.   Endocrine: Negative for polyuria.   Genitourinary:  Negative for bladder incontinence, decreased urine volume, difficulty urinating, discharge, dysuria, enuresis, erectile dysfunction, flank pain, frequency, genital sores, hematuria, penile pain, penile swelling, scrotal swelling, testicular pain and urgency.        Testicular Hypofunction        BPH with obstruction    Musculoskeletal:  Negative for back pain and gait problem.   Integumentary:  Negative for rash.   Neurological:  Negative for speech difficulty and weakness.   Psychiatric/Behavioral:  Negative for behavioral problems and confusion.           Objective     Physical Exam  Vitals and nursing note reviewed.   Constitutional:       General: He is not in acute distress.     Appearance: Normal appearance. He is not ill-appearing, toxic-appearing or diaphoretic.   HENT:      Head: Normocephalic.   Eyes:      Extraocular Movements: Extraocular movements intact.   Cardiovascular:      Rate and Rhythm: Normal rate and regular rhythm.      Heart sounds: Normal heart sounds.   Pulmonary:      Effort: Pulmonary effort is normal. No respiratory distress.      Breath sounds: Normal breath sounds. No wheezing, rhonchi or rales.   Abdominal:      General: Bowel sounds are normal.      Palpations: Abdomen is soft.      Tenderness: There is no abdominal tenderness. There is no right CVA tenderness, left CVA tenderness, guarding or rebound.    Musculoskeletal:         General: Normal range of motion.      Cervical back: Normal range of motion. No rigidity.   Skin:     General: Skin is warm and dry.   Neurological:      General: No focal deficit present.      Mental Status: He is alert and oriented to person, place, and time.      Motor: No weakness.      Coordination: Coordination normal.      Gait: Gait normal.   Psychiatric:         Mood and Affect: Mood normal.         Behavior: Behavior normal.         Thought Content: Thought content normal.          Assessment and Plan     Problem List Items Addressed This Visit          Renal/    Benign prostatic hyperplasia       Endocrine    Testicular hypofunction - Primary    Relevant Medications    testosterone undecanoate injection 750 mg (Start on 7/8/2024 12:15 PM)    Other Relevant Orders    Hemoglobin and Hematocrit    Testosterone        Aveed injection today   H&H and Testosterone level in 10 weeks before appointment   Next Aveed shot in 10 weeks   Continue Cialis 5 mg as prescribed   Continue Tamsulosin (Flomax) 0.4 mg as prescribed   Next PSA in April 2025   Follow up with Urology NP CHANNING Zuleta in 10 weeks for next Aveed injection and labs

## 2024-07-03 NOTE — TELEPHONE ENCOUNTER
----- Message from Maicol Negron NP sent at 7/3/2024 11:42 AM CDT -----  Please let patient know his testosterone level was good at 654 and H&H was good at 16.8/50.6, keep his July 8 appointment, thanks   I called and spoke with the pt.  He voiced understanding and said he already looked at it in his portal.  He voiced understanding.

## 2024-07-08 ENCOUNTER — OFFICE VISIT (OUTPATIENT)
Dept: UROLOGY | Facility: CLINIC | Age: 74
End: 2024-07-08
Payer: COMMERCIAL

## 2024-07-08 VITALS
WEIGHT: 201 LBS | DIASTOLIC BLOOD PRESSURE: 84 MMHG | TEMPERATURE: 98 F | HEART RATE: 68 BPM | BODY MASS INDEX: 27.22 KG/M2 | OXYGEN SATURATION: 96 % | HEIGHT: 72 IN | SYSTOLIC BLOOD PRESSURE: 141 MMHG

## 2024-07-08 DIAGNOSIS — N13.8 BENIGN PROSTATIC HYPERPLASIA WITH URINARY OBSTRUCTION: ICD-10-CM

## 2024-07-08 DIAGNOSIS — E29.1 TESTICULAR HYPOFUNCTION: Primary | ICD-10-CM

## 2024-07-08 DIAGNOSIS — N40.1 BENIGN PROSTATIC HYPERPLASIA WITH URINARY OBSTRUCTION: ICD-10-CM

## 2024-07-08 PROBLEM — E53.8 COBALAMIN DEFICIENCY: Status: ACTIVE | Noted: 2024-07-08

## 2024-07-08 PROBLEM — G47.30 SLEEP APNEA: Status: ACTIVE | Noted: 2019-11-25

## 2024-07-08 PROBLEM — Z77.29 EXPOSURE TO POTENTIALLY HAZARDOUS SUBSTANCE: Status: ACTIVE | Noted: 2024-07-08

## 2024-07-08 PROBLEM — R73.03 PREDIABETES: Status: ACTIVE | Noted: 2024-07-08

## 2024-07-08 PROBLEM — N52.9 ERECTILE DYSFUNCTION: Status: ACTIVE | Noted: 2024-07-08

## 2024-07-08 PROCEDURE — 1126F AMNT PAIN NOTED NONE PRSNT: CPT | Mod: S$GLB,,, | Performed by: NURSE PRACTITIONER

## 2024-07-08 PROCEDURE — 3079F DIAST BP 80-89 MM HG: CPT | Mod: S$GLB,,, | Performed by: NURSE PRACTITIONER

## 2024-07-08 PROCEDURE — 99213 OFFICE O/P EST LOW 20 MIN: CPT | Mod: 25,S$GLB,, | Performed by: NURSE PRACTITIONER

## 2024-07-08 PROCEDURE — 96372 THER/PROPH/DIAG INJ SC/IM: CPT | Mod: S$GLB,,, | Performed by: NURSE PRACTITIONER

## 2024-07-08 PROCEDURE — 1159F MED LIST DOCD IN RCRD: CPT | Mod: S$GLB,,, | Performed by: NURSE PRACTITIONER

## 2024-07-08 PROCEDURE — 3077F SYST BP >= 140 MM HG: CPT | Mod: S$GLB,,, | Performed by: NURSE PRACTITIONER

## 2024-07-08 PROCEDURE — 99999 PR PBB SHADOW E&M-EST. PATIENT-LVL V: CPT | Mod: PBBFAC,,, | Performed by: NURSE PRACTITIONER

## 2024-07-08 NOTE — PROGRESS NOTES
Aveed Injection    A Pt. Guide was given and Pt agreed to stay in the clinic for 30 min's after injection to monitor for side effects. Aveed IM Injection was given in right Gluteus medius upper lateral quadrant. No blood was noted with aspiration prior to deep IM injection of 750 mg / 3 ml testosterone undecanoate. Medicine was injected over 60 seconds pt tolerated injection well at 11:30 and monitored until 12:00 with no side effects. Pt left clinic in good condition.

## 2024-07-08 NOTE — PATIENT INSTRUCTIONS
Aveed injection today   H&H and Testosterone level in 10 weeks before appointment   Next Aveed shot in 10 weeks   Continue Cialis 5 mg as prescribed   Continue Tamsulosin (Flomax) 0.4 mg as prescribed   Next PSA in April 2025   Follow up with Urology NP CHANNING Zuleta in 10 weeks for next Aveed injection and labs

## 2024-09-16 ENCOUNTER — OFFICE VISIT (OUTPATIENT)
Dept: UROLOGY | Facility: CLINIC | Age: 74
End: 2024-09-16
Payer: COMMERCIAL

## 2024-09-16 VITALS
DIASTOLIC BLOOD PRESSURE: 86 MMHG | HEIGHT: 72 IN | SYSTOLIC BLOOD PRESSURE: 154 MMHG | BODY MASS INDEX: 26.82 KG/M2 | HEART RATE: 73 BPM | WEIGHT: 198 LBS

## 2024-09-16 DIAGNOSIS — E29.1 TESTICULAR HYPOFUNCTION: Primary | ICD-10-CM

## 2024-09-16 DIAGNOSIS — R53.83 FATIGUE, UNSPECIFIED TYPE: ICD-10-CM

## 2024-09-16 DIAGNOSIS — R35.0 BENIGN PROSTATIC HYPERPLASIA WITH URINARY FREQUENCY: ICD-10-CM

## 2024-09-16 DIAGNOSIS — N40.1 BENIGN PROSTATIC HYPERPLASIA WITH URINARY FREQUENCY: ICD-10-CM

## 2024-09-16 DIAGNOSIS — E29.1 TESTICULAR HYPOGONADISM: ICD-10-CM

## 2024-09-16 PROCEDURE — 99499 UNLISTED E&M SERVICE: CPT | Mod: S$GLB,,, | Performed by: UROLOGY

## 2024-09-16 PROCEDURE — 96372 THER/PROPH/DIAG INJ SC/IM: CPT | Mod: S$GLB,,, | Performed by: UROLOGY

## 2024-09-16 PROCEDURE — 99999 PR PBB SHADOW E&M-EST. PATIENT-LVL V: CPT | Mod: PBBFAC,,, | Performed by: UROLOGY

## 2024-09-16 PROCEDURE — 1159F MED LIST DOCD IN RCRD: CPT | Mod: S$GLB,,, | Performed by: UROLOGY

## 2024-09-16 PROCEDURE — 1126F AMNT PAIN NOTED NONE PRSNT: CPT | Mod: S$GLB,,, | Performed by: UROLOGY

## 2024-09-16 PROCEDURE — 3079F DIAST BP 80-89 MM HG: CPT | Mod: S$GLB,,, | Performed by: UROLOGY

## 2024-09-16 PROCEDURE — 3077F SYST BP >= 140 MM HG: CPT | Mod: S$GLB,,, | Performed by: UROLOGY

## 2024-09-16 PROCEDURE — 1101F PT FALLS ASSESS-DOCD LE1/YR: CPT | Mod: S$GLB,,, | Performed by: UROLOGY

## 2024-09-16 PROCEDURE — 3288F FALL RISK ASSESSMENT DOCD: CPT | Mod: S$GLB,,, | Performed by: UROLOGY

## 2024-09-16 NOTE — PROGRESS NOTES
Aveed Injection    A Pt. Guide was given and Pt agreed to stay in the clinic for 30 min's after injection to monitor for side effects. Aveed IM Injection was given in left Gluteus medius upper lateral quadrant. No blood was noted with aspiration prior to deep IM injection of 750 mg / 3 ml testosterone undecanoate. Medicine was injected over 60 seconds pt tolerated injection well at 15:30 and monitored until 16:00 with no side effects. Pt left clinic in good condition.

## 2024-09-16 NOTE — PATIENT INSTRUCTIONS
AVEED given as described.  Discussed lab data with Mr. Felipe.  He will have next testosterone, H&H, and AVEED by Mr. Negron in 10 weeks.

## 2024-09-16 NOTE — PROGRESS NOTES
Mr. Feilpe is doing well and has responded very well to the AVEED.  He is still in therapeutic range with most recent testosterone 551.  Hemoglobin/hematocrit was 16.4 g/49.5%..  Doing very well clinically with satisfactory energy etc. Voiding okay just with daily Cialis.  See note of Mr. Moses.

## 2024-09-17 ENCOUNTER — TELEPHONE (OUTPATIENT)
Dept: UROLOGY | Facility: CLINIC | Age: 74
End: 2024-09-17
Payer: COMMERCIAL

## 2024-09-17 NOTE — TELEPHONE ENCOUNTER
Scheduled Aveed Appointment on 11/25/2024 at 11:00 with SKYE Lea.    Spoke with patient and reported that his next appointment is on 11/25/2024 at 11:00 am. Pt said that was okay.

## 2024-11-25 ENCOUNTER — OFFICE VISIT (OUTPATIENT)
Dept: UROLOGY | Facility: CLINIC | Age: 74
End: 2024-11-25
Payer: COMMERCIAL

## 2024-11-25 VITALS
OXYGEN SATURATION: 99 % | HEIGHT: 72 IN | DIASTOLIC BLOOD PRESSURE: 84 MMHG | BODY MASS INDEX: 26.55 KG/M2 | HEART RATE: 61 BPM | SYSTOLIC BLOOD PRESSURE: 157 MMHG | WEIGHT: 196 LBS

## 2024-11-25 DIAGNOSIS — E29.1 TESTICULAR HYPOFUNCTION: Primary | Chronic | ICD-10-CM

## 2024-11-25 DIAGNOSIS — R53.83 FATIGUE, UNSPECIFIED TYPE: ICD-10-CM

## 2024-11-25 DIAGNOSIS — N52.9 ERECTILE DYSFUNCTION, UNSPECIFIED ERECTILE DYSFUNCTION TYPE: Chronic | ICD-10-CM

## 2024-11-25 DIAGNOSIS — N40.1 BENIGN PROSTATIC HYPERPLASIA WITH NOCTURIA: Chronic | ICD-10-CM

## 2024-11-25 DIAGNOSIS — E29.1 TESTICULAR HYPOFUNCTION: Primary | ICD-10-CM

## 2024-11-25 DIAGNOSIS — E29.1 TESTICULAR HYPOGONADISM: Chronic | ICD-10-CM

## 2024-11-25 DIAGNOSIS — R35.1 BENIGN PROSTATIC HYPERPLASIA WITH NOCTURIA: Chronic | ICD-10-CM

## 2024-11-25 PROBLEM — N40.0 BENIGN PROSTATIC HYPERPLASIA: Chronic | Status: ACTIVE | Noted: 2021-07-26

## 2024-11-25 PROCEDURE — 1159F MED LIST DOCD IN RCRD: CPT | Mod: S$GLB,,, | Performed by: NURSE PRACTITIONER

## 2024-11-25 PROCEDURE — 96372 THER/PROPH/DIAG INJ SC/IM: CPT | Mod: S$GLB,,, | Performed by: NURSE PRACTITIONER

## 2024-11-25 PROCEDURE — 99214 OFFICE O/P EST MOD 30 MIN: CPT | Mod: 25,S$GLB,, | Performed by: NURSE PRACTITIONER

## 2024-11-25 PROCEDURE — 99999 PR PBB SHADOW E&M-EST. PATIENT-LVL V: CPT | Mod: PBBFAC,,, | Performed by: NURSE PRACTITIONER

## 2024-11-25 PROCEDURE — 1160F RVW MEDS BY RX/DR IN RCRD: CPT | Mod: S$GLB,,, | Performed by: NURSE PRACTITIONER

## 2024-11-25 PROCEDURE — 3079F DIAST BP 80-89 MM HG: CPT | Mod: S$GLB,,, | Performed by: NURSE PRACTITIONER

## 2024-11-25 PROCEDURE — 3077F SYST BP >= 140 MM HG: CPT | Mod: S$GLB,,, | Performed by: NURSE PRACTITIONER

## 2024-11-25 NOTE — PROGRESS NOTES
Subjective     Patient ID: Alon Felipe is a 74 y.o. male.    Chief Complaint: Follow-up (10 week AVEED injection and H & H, and testosterone labs. )    Urology History  Symptoms:  No dysuria, No urethral discharge, No hematuria, No erectile dysfunction  Associated Symptoms:  No abdominal pain, No edema  History of Present Illness  This 64-year-old white male still works with [employer]. He has a history as noted in the dictation of 1/16/2012 when I last saw him. He is seen about every 2 years. He is on Flomax and Uroxatral as well as Cialis. We had to encourage him to return for followup visit.      He has not had any flare-ups with his chronic bacterial prostatitis. The patient still voids adequately then he [facility]. He takes super beta prostate in addition to the Uroxatral and Avodart.      I talked with him about Cialis. He normally takes a 20 mg dose. He is able to penetrate but he had poor quality erections. I suggested that he switch to the 5 mg dose and try two 5s for total of 10 mg, etc. I gave him a sample box and a coupon. By rectal examination, he has a 20 to 30 grams size prostate that feels benign. He understands that I plan to retire.  (December 9, 2014)  ------------------------------------------------------------------------------------------------------------------------------------------------------------  The above note is the last clinic note of Dr. Dani De La Vega     Mr. Felipe is 68 years old. He is the  of Ms. Jami Felipe. Patient been on combination therapy for bladder outlet obstruction from BPH for several years with Avodart and tamsulosin. He used to be on 5 mg Cialis but is no longer on that. He is having nocturia generally no more than one time nightly. [facility] 0.76. Patient has been on testosterone injections for several years but did not get really good results from that because of the peaks and valleys it  produced from only taking once a month. Not on anything for testicular hypogonadism at present. Voiding okay currently; has nocturia one time nightly but IPSS score is 19. (June 18, 2018)     Mr. Felipe is in for the first time in nearly one year. He had been using injectable testosterone but we switched him to testosterone cream on April 30 and he's been on 5% testosterone since. He does not think he has as much energy as he had before, but doing fairly well. He has some urinary frequency in the morning after he drinks his coffee but no nocturia. No worsening bladder outlet obstructive symptoms. Generally feeling okay. (Jerica 3, 2019)     Mr. Felipe has been doing reasonably well although his energy level is not as good as when he took testosterone shots. That is not surprising. Testosterone is pending as he had his level drawn this morning. No worsening bladder outlet obstruction. He has frequency worse in the morning after he drinks coffee but does not have nocturia but one time nightly typically. Overall satisfactory and doing okay. Desires no additional help with voiding. Serum testosterone level pending.  (Jerica 15, 2020)     PSA Results:  Past PSA Results   PSA was 0.81 on April 30, 2020  PSA was 0.76 on March 3, 2017  PSA was 0.851 on January 16, 2012  PSA was 0.706 on February 28, 2007  PSA was 1.74 on January 30, 2006  --------------------------------------------------------------------------------------------------     PSA was 0.53 on 3/18/2021  PSA was 0.425 on February 18, 2022  PSA was 1.390 on April 23, 2024     Mr. Felipe is in for his 1 year follow-up.  He has testicular hypogonadism and is on 9% testosterone.  Recent testosterone is barely in therapeutic range despite using his testosterone cream on a regular basis as directed.  Testosterone is lower than last year.  We can go up on concentration again.  He had a PSA in March that was 0.53.  [facility]  Had recent respiratory illness but has recovered from that.  Energy level not as good as desired but he is quite active.   (July 28, 2021)     Mr. Felipe is in for 1st visit in 1 year.  He has continued on 10% testosterone but wants to see if he can be switched to Aveed.  He would find that to be more convenient if we can get him approved.  Needs renewal of finasteride and tamsulosin.  Voiding is stable and he does not feel he needs any extra help with voiding.  He had his PSA done and it is normal at 0.52.  We will delay getting a serum testosterone until after he starts the Aveed and get it done after the 1st couple of injections. (August 9, 2022)     Mr. Felipe was approved for his Aveed.  He returned today for 1st injection.  Doing okay clinically. [September 9, 2022]     Mr. Felipe is feeling okay and feels that his energy level is satisfactory.  We did not do testosterone but so far he seems to be responding favorably to the Aveed.  He is here for injection #2 today.     Mr. Felipe is here for his 3rd Aveed and is generally pleased with the results as is fatigue is better although has had some fall off for last week or so.  Other than that has been pleased with the improvement in his clinical symptoms.  No new medical issues.  [December 19, 2022]     Mr. Felipe is here for his AVEED and lab studies including his yearly PSA screen.  Seems be doing well on the current regimen although he says toward the end of the 10 weeks he thinks he is a little more fatigued.  PSA was normal at 0.983 and serum testosterone still well within normal range of 467 after he left.  We will continue on the 10 week regimen  of AVEED and get lab studies again next time.  No new problems.  [February 27, 2023]     Mr. Felipe continues to do well with his AVEED and is pleased with the results.  Serum testosterone up to 517 with satisfactory hemoglobin and hematocrit although they have gone up slightly.  No new problems.  [May 8, 2023]      Agree with note Mr. Moses.  Patient here primarily for AVEED.  He continues to do well with good energy level and good response to the medication.  Lab work looks good..  Serum testosterone was 481; hemoglobin/hematocrit 16.6 g/49.1%; PSA was 1.390  ---------------------------------------------------------------------------------------------------------------------------------------------------------------------------------------------------------------------------------------------------------------------------------------------------------    The  above notes are from Dr. JENA Zepeda in the EMR system      This pleasant 74 year old male presents to the clinic for his next Aveed injection.  He is a previous patient of Dr. JENA Zepeda treated for testicular hypofunction and BPH with obstruction.  He states he is doing good today.  He denies any new Urological problem.  He is pleased with the treatment of the testicular hypofunction and BPH with obstruction.  He desires to continue the current management of both.  He is on the Aveed injections 750 mg IM one shot every 10 weeks.  He is also on Flomax 0.4 mg and Cialis 5 mg and is tolerating these medications without side effects.  He denies dysuria, hematuria or voiding complaints today.  He denies fever, chills, nausea or vomiting. He denies any  pains. His H&H was 16.8/50.6 on July 3, 2024 and this is WNL.  His testosterone level was 654 on July 3, 2024 and this is WNL.  He understands that he will need to wait 30 minutes after the Aveed injection before leaving.   We will give the Aveed injection today.  He will continue the Flomax and Cialis as prescribed.  He will get the H&H and Testosterone level before the next visit.  I will see him back in the clinic in 10 weeks for the next Aveed treatment and labs.  I discussed the plan in detail with the patient and he is in agreement with the plan. All his questions were answered at today's visit.    ---------------------------------------------------------------------------------------------------------------------------  [July 8, 2024].   ------------------------------------------------------------------------------------------------------------------------------------------------------------------------------------------------------------------------------------------------------------------------  The above note is from GIOVANNY Zuleta in the EMR system    Mr. Felipe is doing well and has responded very well to the AVEED.  He is still in therapeutic range with most recent testosterone 551.  Hemoglobin/hematocrit was 16.4 g/49.5%..  Doing very well clinically with satisfactory energy etc. Voiding okay just with daily Cialis.  See note of Mr. Moses.   ---------------------------------------------------------------------------------------------------------- [September 16, 2024].   -----------------------------------------------------------------------------------------------------------------------------------------------------------------------------------------------------------------------------------------------------------------------    The above note is from Dr. Rene Zepeda in the EMR system    This pleasant 74-year-old male presents to the clinic for his next Aveed injection, BPH, and ED. patient states he is doing good today.  He denies any new urological complaints.  He is pleased with the treatment of the testicular hypofunction, BPH, and erectile dysfunction.  He desires to continue the current management of all 3.  He is tolerating the Aveed injections, Avodart, Flomax, and Cialis 5 mg without side effects.  He understands he will have to [place] in the clinic 30 minutes after the injection.  His testosterone level was not reported by the end of today's visit.  His H&H today was reported as 16.2/47.7 and these are within normal limits.  He is pleased with the way he is voiding.  His IPSS score is 1  that reflects nocturia 1 time a night.  He denies hematuria, denies dysuria, denies incomplete bladder emptying, denies frequency, denies intermittency, denies urgency, denies weak stream, or straining.  He denies fever, chills, nausea, or vomiting.  Denies any  pains.  He denies any complaints of erectile dysfunction on the Cialis 5 mg.  Through shared decision-making with the patient, he desires to have the Aveed 750 mg injection today.  He desires to continue the Avodart, Flomax, and Cialis 5 mg as prescribed.  He will get the H&H and testosterone level prior to the next visit.  We will see the patient back in the clinic in 10 weeks for his next Aveed injection and labs.  I discussed the plan in detail with the patient and he is in agreement with the plan.  All his questions were answered at today's visit.  I spent 30 minutes counseling this patient, reviewing the chart, and reviewing labs.  --------------------------------------------------------------------------------------------------------------------------------  [November 25, 2024].             Review of Systems   Constitutional:  Positive for fatigue. Negative for activity change and fever.   HENT:  Negative for hearing loss and trouble swallowing.    Eyes:  Negative for visual disturbance.   Respiratory:  Negative for cough, shortness of breath and wheezing.    Cardiovascular:  Negative for chest pain.   Gastrointestinal:  Negative for abdominal pain, diarrhea, nausea and vomiting.   Endocrine: Negative for polyuria.   Genitourinary:  Positive for erectile dysfunction. Negative for bladder incontinence, decreased urine volume, difficulty urinating, discharge, dysuria, enuresis, flank pain, frequency, genital sores, hematuria, penile pain, penile swelling, scrotal swelling, testicular pain and urgency.        Testicular hypofunction       Testicular hypogonadism        BPH with nocturia   Musculoskeletal:  Negative for back pain and gait problem.    Integumentary:  Negative for rash.   Neurological:  Negative for speech difficulty and weakness.   Psychiatric/Behavioral:  Negative for behavioral problems and confusion.           Objective     Physical Exam  Vitals and nursing note reviewed.   Constitutional:       General: He is not in acute distress.     Appearance: Normal appearance. He is not ill-appearing, toxic-appearing or diaphoretic.   HENT:      Head: Normocephalic.   Eyes:      Extraocular Movements: Extraocular movements intact.   Cardiovascular:      Rate and Rhythm: Normal rate and regular rhythm.      Heart sounds: Normal heart sounds.   Pulmonary:      Effort: Pulmonary effort is normal. No respiratory distress.      Breath sounds: Normal breath sounds. No wheezing, rhonchi or rales.   Abdominal:      General: Bowel sounds are normal.      Palpations: Abdomen is soft.      Tenderness: There is no abdominal tenderness. There is no right CVA tenderness, left CVA tenderness, guarding or rebound.   Musculoskeletal:         General: Normal range of motion.      Cervical back: Normal range of motion. No rigidity.   Skin:     General: Skin is warm and dry.   Neurological:      General: No focal deficit present.      Mental Status: He is alert and oriented to person, place, and time.      Motor: No weakness.      Coordination: Coordination normal.      Gait: Gait normal.   Psychiatric:         Mood and Affect: Mood normal.         Behavior: Behavior normal.         Thought Content: Thought content normal.            Assessment and Plan     1. Testicular hypofunction  -     testosterone undecanoate injection 750 mg  -     Hemoglobin and Hematocrit; Future; Expected date: 01/27/2025  -     Testosterone; Future; Expected date: 01/27/2025    2. Fatigue, unspecified type    3. Testicular hypogonadism  -     testosterone undecanoate injection 750 mg  -     Hemoglobin and Hematocrit; Future; Expected date: 01/27/2025  -     Testosterone; Future; Expected date:  01/27/2025    4. Benign prostatic hyperplasia with nocturia    5. Erectile dysfunction, unspecified erectile dysfunction type             Aveed 750 mg IM x1 today  H&H and testosterone level in 10 weeks  Next Aveed injection in 10 weeks  Continue tadalafil (Cialis) 5 mg 1 daily  Continue dutasteride (Avodart) 0 0.5 mg 1 daily  Continue tamsulosin (Flomax) 0.4 mg 1 at bedtime  Follow-up with urology in 10 weeks

## 2024-11-25 NOTE — PROGRESS NOTES
Nurse identified pt by name and birthdate.  Gave pt information sheet on Aveed.  Pt agreed to stay in clinic for 30 minutes after injection.    Washed hands and vince up Aveed 750mg in 5ml syringe.  Attached 22 g 1 1/2 in needle to syringe.  Cleaned site to left gluteus medius with alcohol preps x 2, and air dried.  Injected Medication slowly over 60 seconds  into site after aspirating x 2 and getting back no blood on aspiration.  He tolerated injection well.  Pt stayed in clinic for 30 minutes after injection and left in good spirits, no signs of reaction noted.  AVS presented to pt.

## 2024-11-25 NOTE — PATIENT INSTRUCTIONS
Aveed 750 mg IM x1 today  H&H and testosterone level in 10 weeks  Next Aveed injection in 10 weeks  Continue tadalafil (Cialis) 5 mg 1 daily  Continue dutasteride (Avodart) 0 0.5 mg 1 daily  Continue tamsulosin (Flomax) 0.4 mg 1 at bedtime  Follow-up with urology in 10 weeks

## 2025-02-03 ENCOUNTER — OFFICE VISIT (OUTPATIENT)
Dept: UROLOGY | Facility: CLINIC | Age: 75
End: 2025-02-03
Payer: COMMERCIAL

## 2025-02-03 VITALS
HEIGHT: 72 IN | WEIGHT: 196 LBS | BODY MASS INDEX: 26.55 KG/M2 | DIASTOLIC BLOOD PRESSURE: 75 MMHG | HEART RATE: 42 BPM | SYSTOLIC BLOOD PRESSURE: 159 MMHG | OXYGEN SATURATION: 99 %

## 2025-02-03 DIAGNOSIS — E29.1 TESTICULAR HYPOFUNCTION: Primary | ICD-10-CM

## 2025-02-03 PROCEDURE — 1160F RVW MEDS BY RX/DR IN RCRD: CPT | Mod: S$GLB,,, | Performed by: UROLOGY

## 2025-02-03 PROCEDURE — 3077F SYST BP >= 140 MM HG: CPT | Mod: S$GLB,,, | Performed by: UROLOGY

## 2025-02-03 PROCEDURE — 99212 OFFICE O/P EST SF 10 MIN: CPT | Mod: S$GLB,,, | Performed by: UROLOGY

## 2025-02-03 PROCEDURE — 3078F DIAST BP <80 MM HG: CPT | Mod: S$GLB,,, | Performed by: UROLOGY

## 2025-02-03 PROCEDURE — 99999 PR PBB SHADOW E&M-EST. PATIENT-LVL IV: CPT | Mod: PBBFAC,,, | Performed by: UROLOGY

## 2025-02-03 PROCEDURE — 1159F MED LIST DOCD IN RCRD: CPT | Mod: S$GLB,,, | Performed by: UROLOGY

## 2025-02-03 NOTE — PROGRESS NOTES
Assessment:   1. Testicular hypofunction  -     Testosterone; Future; Expected date: 02/03/2025  -     CBC Auto Differential; Future; Expected date: 02/03/2025  -     PSA, Total (Diagnostic); Future; Expected date: 02/03/2025         Plan:     His testosterone is greater than a 1000 so I have asked that we postpone for 3 weeks.     Plan to recheck blood work.  Return in 3 weeks.             Elijah Fang MD  Urology  02/03/2025          UROLOGY HISTORY AND PHYSICAL EXAM    Subjective:      Patient ID: Alon Felipe is a 75 y.o. male.    Chief Complaint::   HPI    The patient is a 75-year-old male currently on combination therapy for BPH with a history of testosterone deficiency currently on testosterone replacement therapy.  He presents today and reports that he is doing well however he has noticed his value this morning is too high and wanted to postpone.  I agreed.       Past Medical History:   Diagnosis Date    Acute superficial gastritis without hemorrhage 9/7/2022    Enlarged prostate     Hypertension     Stroke      Past Surgical History:   Procedure Laterality Date    BACK SURGERY          Current Outpatient Medications on File Prior to Visit   Medication Sig Dispense Refill    cyanocobalamin 1,000 mcg/mL injection INJECT 1ML INTRAMUSCULARLY MONTHLY FOR ANEMIA      dutasteride (AVODART) 0.5 mg capsule Take 1 capsule (0.5 mg total) by mouth once daily. 90 capsule 3    lisinopriL (PRINIVIL,ZESTRIL) 20 MG tablet 1 tablet Orally Once a day for 30 day(s)      montelukast (SINGULAIR) 10 mg tablet       omeprazole (PRILOSEC) 20 MG capsule Take 20 mg by mouth once daily.      tadalafiL (CIALIS) 5 MG tablet Take 1 tablet (5 mg total) by mouth once daily. 30 tablet 11    tamsulosin (FLOMAX) 0.4 mg Cap TAKE ONE CAPSULE BY MOUTH DAILY FOR URINATION. TAKE 30 MINUTES AFTER EVENING MEAL.  REPLACES ALFUZOSIN. 90 capsule 3    testosterone undecanoate (AVEED) 750 mg/3 mL (250 mg/mL) injection as directed  Intramuscular once every 10 weeks      acyclovir (ZOVIRAX) 400 MG tablet Take 2 tablets (800 mg total) by mouth 4 (four) times daily. 40 tablet 1    albuterol (VENTOLIN HFA) 90 mcg/actuation inhaler Inhale 2 puffs into the lungs every 6 (six) hours as needed for Wheezing. Rescue (Patient not taking: No sig reported) 8 g 0    amLODIPine (NORVASC) 10 MG tablet Take 10 mg by mouth daily as needed. (Patient not taking: Reported on 11/25/2024)      gabapentin (NEURONTIN) 400 MG capsule Take 1 capsule (400 mg total) by mouth 3 (three) times daily. 30 capsule 1    olmesartan (BENICAR) 20 MG tablet Take 1 tablet (20 mg total) by mouth once daily. (Patient not taking: Reported on 5/8/2023) 90 tablet 3    simvastatin (ZOCOR) 20 MG tablet Take 20 mg by mouth every evening. (Patient not taking: Reported on 2/3/2025)       Current Facility-Administered Medications on File Prior to Visit   Medication Dose Route Frequency Provider Last Rate Last Admin    BUPivacaine (PF) 0.25% (2.5 mg/ml) injection 1 mL  1 mL   Alon Acevedo MD   1 mL at 01/19/24 1040    triamcinolone acetonide injection 40 mg  40 mg Intra-articular  Alon Acevedo MD   40 mg at 01/19/24 1040        Review of patient's allergies indicates:   Allergen Reactions    Iodine and iodide containing products Itching and Swelling    Contrast media      Vitals:    02/03/25 1442   BP: (!) 159/75   Pulse: (!) 42          Review of Systems   Constitutional:  Negative for activity change.      Objective:     Physical Exam  Constitutional:       Appearance: He is not diaphoretic.   Eyes:      General: No scleral icterus.  Musculoskeletal:         General: Normal range of motion.   Neurological:      Mental Status: He is alert. Mental status is at baseline.   Psychiatric:         Mood and Affect: Mood normal.         Behavior: Behavior normal.         Thought Content: Thought content normal.         Judgment: Judgment normal.        Lab Results   Component Value  Date    PSA 1.390 04/23/2024    PSA 0.983 02/27/2023    PSA 0.425 02/18/2022        CMP  Sodium   Date Value Ref Range Status   06/23/2021 136 136 - 145 mmol/L Final     Potassium   Date Value Ref Range Status   06/23/2021 3.4 (L) 3.5 - 5.1 mmol/L Final     Chloride   Date Value Ref Range Status   06/23/2021 102 98 - 107 mmol/L Final     CO2   Date Value Ref Range Status   06/23/2021 22 21 - 32 mmol/L Final     Glucose   Date Value Ref Range Status   06/23/2021 148 (H) 74 - 106 mg/dL Final     BUN   Date Value Ref Range Status   06/23/2021 28 (H) 7 - 18 mg/dL Final     Creatinine   Date Value Ref Range Status   06/23/2021 1.15 0.70 - 1.30 mg/dL Final     Calcium   Date Value Ref Range Status   06/23/2021 8.8 8.5 - 10.1 mg/dL Final     Total Protein   Date Value Ref Range Status   06/23/2021 6.8 6.4 - 8.2 g/dL Final     Albumin   Date Value Ref Range Status   06/23/2021 3.1 (L) 3.5 - 5.0 g/dL Final     Bilirubin, Total   Date Value Ref Range Status   06/23/2021 0.5 >0.0 - 1.2 mg/dL Final     Alk Phos   Date Value Ref Range Status   06/23/2021 106 45 - 115 U/L Final     AST   Date Value Ref Range Status   06/23/2021 79 (H) 15 - 37 U/L Final     ALT   Date Value Ref Range Status   06/23/2021 112 (H) 16 - 61 U/L Final     Anion Gap   Date Value Ref Range Status   06/23/2021 15 7 - 16 mmol/L Final      BMP  Lab Results   Component Value Date     06/23/2021    K 3.4 (L) 06/23/2021     06/23/2021    CO2 22 06/23/2021    BUN 28 (H) 06/23/2021    CREATININE 1.15 06/23/2021    CALCIUM 8.8 06/23/2021    ANIONGAP 15 06/23/2021

## 2025-02-04 DIAGNOSIS — E29.1 TESTICULAR HYPOFUNCTION: Primary | ICD-10-CM

## 2025-02-24 DIAGNOSIS — E29.1 TESTICULAR HYPOFUNCTION: Primary | ICD-10-CM

## 2025-03-13 ENCOUNTER — TELEPHONE (OUTPATIENT)
Dept: UROLOGY | Facility: CLINIC | Age: 75
End: 2025-03-13
Payer: COMMERCIAL

## 2025-03-13 NOTE — TELEPHONE ENCOUNTER
Patient was called back, message left letting him know Dr. Fang is out of the office until Monday and he would be called next week with a time frame to get labs and which ones.

## 2025-03-13 NOTE — TELEPHONE ENCOUNTER
Grecia Turner Staff  Caller: Unspecified (Today, 12:26 PM)  Pt had lab done and testosterone is still too high to get Aveed inj. Needs to know if she needs more lab.  Who Called: Alon Felipe      Patient's Preferred Phone Number on File: [phone]

## 2025-03-19 ENCOUNTER — TELEPHONE (OUTPATIENT)
Dept: UROLOGY | Facility: CLINIC | Age: 75
End: 2025-03-19
Payer: COMMERCIAL

## 2025-03-19 DIAGNOSIS — E29.1 TESTICULAR HYPOFUNCTION: Primary | ICD-10-CM

## 2025-03-19 NOTE — TELEPHONE ENCOUNTER
Patients lab work from 3/10 was elevated, Testosterone of 875.5.  Dr. Fang was notified, order given to delay AVEED and recheck Testosterone and get an Hgb/Hct in 4 weeks.  Patient was called and notified, he verbalized understanding.

## 2025-04-11 ENCOUNTER — TELEPHONE (OUTPATIENT)
Dept: UROLOGY | Facility: CLINIC | Age: 75
End: 2025-04-11
Payer: COMMERCIAL

## 2025-04-11 NOTE — TELEPHONE ENCOUNTER
Patient called wanting to know if he could come to his upcoming appointment for AVEED injection based on lab values.  Dr. Fang was notified, he verbalized lab was ok and patient may proceed with upcoming AVEED injection.  Patient was notified and verbalized understanding.

## 2025-04-14 ENCOUNTER — OFFICE VISIT (OUTPATIENT)
Dept: UROLOGY | Facility: CLINIC | Age: 75
End: 2025-04-14
Payer: COMMERCIAL

## 2025-04-14 DIAGNOSIS — N40.1 BENIGN PROSTATIC HYPERPLASIA WITH URINARY OBSTRUCTION: ICD-10-CM

## 2025-04-14 DIAGNOSIS — N13.8 BENIGN PROSTATIC HYPERPLASIA WITH URINARY OBSTRUCTION: ICD-10-CM

## 2025-04-14 DIAGNOSIS — N52.9 ERECTILE DYSFUNCTION, UNSPECIFIED ERECTILE DYSFUNCTION TYPE: ICD-10-CM

## 2025-04-14 DIAGNOSIS — N40.0 BENIGN PROSTATIC HYPERPLASIA, UNSPECIFIED WHETHER LOWER URINARY TRACT SYMPTOMS PRESENT: ICD-10-CM

## 2025-04-14 DIAGNOSIS — E29.1 TESTICULAR HYPOFUNCTION: Primary | ICD-10-CM

## 2025-04-14 PROCEDURE — 96372 THER/PROPH/DIAG INJ SC/IM: CPT | Mod: S$GLB,,, | Performed by: UROLOGY

## 2025-04-14 PROCEDURE — 1159F MED LIST DOCD IN RCRD: CPT | Mod: S$GLB,,, | Performed by: UROLOGY

## 2025-04-14 PROCEDURE — 1160F RVW MEDS BY RX/DR IN RCRD: CPT | Mod: S$GLB,,, | Performed by: UROLOGY

## 2025-04-14 PROCEDURE — 99214 OFFICE O/P EST MOD 30 MIN: CPT | Mod: 25,S$GLB,, | Performed by: UROLOGY

## 2025-04-14 PROCEDURE — 99999 PR PBB SHADOW E&M-EST. PATIENT-LVL II: CPT | Mod: PBBFAC,,, | Performed by: UROLOGY

## 2025-04-14 RX ORDER — TADALAFIL 5 MG/1
5 TABLET ORAL DAILY
Qty: 30 TABLET | Refills: 11 | Status: SHIPPED | OUTPATIENT
Start: 2025-04-14 | End: 2026-04-14

## 2025-04-14 RX ORDER — DUTASTERIDE 0.5 MG/1
0.5 CAPSULE, LIQUID FILLED ORAL DAILY
Qty: 90 CAPSULE | Refills: 3 | Status: SHIPPED | OUTPATIENT
Start: 2025-04-14 | End: 2026-04-09

## 2025-04-14 RX ORDER — TAMSULOSIN HYDROCHLORIDE 0.4 MG/1
CAPSULE ORAL
Qty: 90 CAPSULE | Refills: 3 | Status: SHIPPED | OUTPATIENT
Start: 2025-04-14

## 2025-04-14 NOTE — PROGRESS NOTES
Aveed Injection  Patient agreed to stay in the clinic for 30 min's after injection to monitor for side effects. Aveed IM Injection was given in Gluteus medius upper right quadrant. No blood was noted with aspiration prior to deep IM injection of 750 mg / 3 ml testosterone undecanoate. Medicine was injected over 60 seconds, patient tolerated injection well at 1435 and was monitored until 1505 with no side effects. Patient left clinic in good condition.

## 2025-04-15 NOTE — PROGRESS NOTES
Assessment:   1. Testicular hypofunction  -     testosterone undecanoate injection 750 mg  -     Prior authorization Order  -     Testosterone; Future; Expected date: 06/23/2025  -     Hemoglobin and Hematocrit; Future; Expected date: 06/23/2025    2. Benign prostatic hyperplasia with urinary obstruction  -     tadalafiL (CIALIS) 5 MG tablet; Take 1 tablet (5 mg total) by mouth once daily.  Dispense: 30 tablet; Refill: 11  -     dutasteride (AVODART) 0.5 mg capsule; Take 1 capsule (0.5 mg total) by mouth once daily.  Dispense: 90 capsule; Refill: 3  -     tamsulosin (FLOMAX) 0.4 mg Cap; TAKE ONE CAPSULE BY MOUTH DAILY FOR URINATION. TAKE 30 MINUTES AFTER EVENING MEAL.  REPLACES ALFUZOSIN.  Dispense: 90 capsule; Refill: 3    3. Benign prostatic hyperplasia, unspecified whether lower urinary tract symptoms present  -     dutasteride (AVODART) 0.5 mg capsule; Take 1 capsule (0.5 mg total) by mouth once daily.  Dispense: 90 capsule; Refill: 3  -     tamsulosin (FLOMAX) 0.4 mg Cap; TAKE ONE CAPSULE BY MOUTH DAILY FOR URINATION. TAKE 30 MINUTES AFTER EVENING MEAL.  REPLACES ALFUZOSIN.  Dispense: 90 capsule; Refill: 3    4. Erectile dysfunction, unspecified erectile dysfunction type  -     tadalafiL (CIALIS) 5 MG tablet; Take 1 tablet (5 mg total) by mouth once daily.  Dispense: 30 tablet; Refill: 11         Plan:     Depot injection was administered.  Plan for testosterone and hemoglobin and hematocrit in June.  We may need to extend the interval.     Continue triple therapy with cialis, tamsulosin and dutasteride.           Elijah Fang MD  Urology  04/14/2025          UROLOGY HISTORY AND PHYSICAL EXAM    Subjective:      Patient ID: Alon Felipe is a 75 y.o. male.    Chief Complaint::   HPI    The patient is a 75-year-old male on testosterone replacement therapy for hypogonadism.  He presents today for his Aveed injection.  He is on triple therapy with dutasteride tamsulosin and Cialis once daily.     IPSS  Questionnaire (AUA-7):  Over the past month    1)  How often have you had a sensation of not emptying your bladder completely after you finish urinating?  1 - Less than 1 time in 5   2)  How often have you had to urinate again less than two hours after you finished urinating? 2 - Less than half the time   3)  How often have you found you stopped and started again several times when you urinated?  1 - Less than 1 time in 5   4) How difficult have you found it to postpone urination?  1 - Less than 1 time in 5   5) How often have you had a weak urinary stream?  1 - Less than 1 time in 5   6) How often have you had to push or strain to begin urination?  0 - Not at all   7) How many times did you most typically get up to urinate from the time you went to bed until the time you got up in the morning?  1 - 1 time   Total score:  0-7 mildly symptomatic    8-19 moderately symptomatic    20-35 severely symptomatic          Past Medical History:   Diagnosis Date    Acute superficial gastritis without hemorrhage 9/7/2022    Enlarged prostate     Hypertension     Stroke      Past Surgical History:   Procedure Laterality Date    BACK SURGERY          Medications Ordered Prior to Encounter[1]     Review of patient's allergies indicates:   Allergen Reactions    Iodine and iodide containing products Itching and Swelling    Contrast media      There were no vitals filed for this visit.       Review of Systems   Constitutional:  Negative for activity change and fever.   Respiratory:  Negative for shortness of breath.    Cardiovascular:  Negative for leg swelling.   Genitourinary:  Negative for difficulty urinating.   Neurological:  Negative for headaches.   Psychiatric/Behavioral:  Negative for sleep disturbance.         Objective:     Physical Exam  Vitals and nursing note reviewed.   Constitutional:       Appearance: Normal appearance. He is normal weight.   HENT:      Head: Normocephalic and atraumatic.   Eyes:      General: No  scleral icterus.     Conjunctiva/sclera: Conjunctivae normal.   Cardiovascular:      Rate and Rhythm: Normal rate.   Pulmonary:      Effort: No respiratory distress.      Breath sounds: No wheezing.   Abdominal:      General: There is no distension.      Palpations: There is no mass.      Tenderness: There is no abdominal tenderness.   Musculoskeletal:         General: No deformity.   Skin:     General: Skin is warm and dry.      Findings: No rash.   Neurological:      General: No focal deficit present.      Mental Status: He is alert.   Psychiatric:         Mood and Affect: Mood normal.         Behavior: Behavior normal.         Thought Content: Thought content normal.         Judgment: Judgment normal.          Lab Results   Component Value Date    PSA 1.289 02/24/2025    PSA 1.390 04/23/2024    PSA 0.983 02/27/2023        CMP  Sodium   Date Value Ref Range Status   06/23/2021 136 136 - 145 mmol/L Final     Potassium   Date Value Ref Range Status   06/23/2021 3.4 (L) 3.5 - 5.1 mmol/L Final     Chloride   Date Value Ref Range Status   06/23/2021 102 98 - 107 mmol/L Final     CO2   Date Value Ref Range Status   06/23/2021 22 21 - 32 mmol/L Final     Glucose   Date Value Ref Range Status   06/23/2021 148 (H) 74 - 106 mg/dL Final     BUN   Date Value Ref Range Status   06/23/2021 28 (H) 7 - 18 mg/dL Final     Creatinine   Date Value Ref Range Status   06/23/2021 1.15 0.70 - 1.30 mg/dL Final     Calcium   Date Value Ref Range Status   06/23/2021 8.8 8.5 - 10.1 mg/dL Final     Total Protein   Date Value Ref Range Status   06/23/2021 6.8 6.4 - 8.2 g/dL Final     Albumin   Date Value Ref Range Status   06/23/2021 3.1 (L) 3.5 - 5.0 g/dL Final     Bilirubin, Total   Date Value Ref Range Status   06/23/2021 0.5 >0.0 - 1.2 mg/dL Final     Alk Phos   Date Value Ref Range Status   06/23/2021 106 45 - 115 U/L Final     AST   Date Value Ref Range Status   06/23/2021 79 (H) 15 - 37 U/L Final     ALT   Date Value Ref Range Status    06/23/2021 112 (H) 16 - 61 U/L Final     Anion Gap   Date Value Ref Range Status   06/23/2021 15 7 - 16 mmol/L Final      BMP  Lab Results   Component Value Date     06/23/2021    K 3.4 (L) 06/23/2021     06/23/2021    CO2 22 06/23/2021    BUN 28 (H) 06/23/2021    CREATININE 1.15 06/23/2021    CALCIUM 8.8 06/23/2021    ANIONGAP 15 06/23/2021              [1]   Current Outpatient Medications on File Prior to Visit   Medication Sig Dispense Refill    acyclovir (ZOVIRAX) 400 MG tablet Take 2 tablets (800 mg total) by mouth 4 (four) times daily. 40 tablet 1    albuterol (VENTOLIN HFA) 90 mcg/actuation inhaler Inhale 2 puffs into the lungs every 6 (six) hours as needed for Wheezing. Rescue (Patient not taking: No sig reported) 8 g 0    amLODIPine (NORVASC) 10 MG tablet Take 10 mg by mouth daily as needed. (Patient not taking: Reported on 11/25/2024)      cyanocobalamin 1,000 mcg/mL injection INJECT 1ML INTRAMUSCULARLY MONTHLY FOR ANEMIA      gabapentin (NEURONTIN) 400 MG capsule Take 1 capsule (400 mg total) by mouth 3 (three) times daily. 30 capsule 1    lisinopriL (PRINIVIL,ZESTRIL) 20 MG tablet 1 tablet Orally Once a day for 30 day(s)      montelukast (SINGULAIR) 10 mg tablet       olmesartan (BENICAR) 20 MG tablet Take 1 tablet (20 mg total) by mouth once daily. (Patient not taking: Reported on 5/8/2023) 90 tablet 3    omeprazole (PRILOSEC) 20 MG capsule Take 20 mg by mouth once daily.      simvastatin (ZOCOR) 20 MG tablet Take 20 mg by mouth every evening. (Patient not taking: Reported on 2/3/2025)      testosterone undecanoate (AVEED) 750 mg/3 mL (250 mg/mL) injection as directed Intramuscular once every 10 weeks      [DISCONTINUED] dutasteride (AVODART) 0.5 mg capsule Take 1 capsule (0.5 mg total) by mouth once daily. 90 capsule 3    [DISCONTINUED] tadalafiL (CIALIS) 5 MG tablet Take 1 tablet (5 mg total) by mouth once daily. 30 tablet 11    [DISCONTINUED] tamsulosin (FLOMAX) 0.4 mg Cap TAKE ONE  CAPSULE BY MOUTH DAILY FOR URINATION. TAKE 30 MINUTES AFTER EVENING MEAL.  REPLACES ALFUZOSIN. 90 capsule 3     Current Facility-Administered Medications on File Prior to Visit   Medication Dose Route Frequency Provider Last Rate Last Admin    BUPivacaine (PF) 0.25% (2.5 mg/ml) injection 1 mL  1 mL   Alon Acevedo MD   1 mL at 01/19/24 1040    triamcinolone acetonide injection 40 mg  40 mg Intra-articular  Alon Acevedo MD   40 mg at 01/19/24 1040

## 2025-06-24 ENCOUNTER — TELEPHONE (OUTPATIENT)
Dept: UROLOGY | Facility: CLINIC | Age: 75
End: 2025-06-24
Payer: COMMERCIAL

## 2025-06-24 DIAGNOSIS — E29.1 TESTICULAR HYPOFUNCTION: Primary | ICD-10-CM

## 2025-06-24 NOTE — TELEPHONE ENCOUNTER
Patient called to see if he needs to keep his appointment next week for his AVEED injection due to his testosterone level was elevated.  After speaking with CHANNING Negron and Dr. Zepeda, new appointment was made for 7/16/25 at 240pm with CHANNING Negron with lab work prior to appointment.  Informed patient of labs to be drawn and new appointment time.  He voiced understanding.

## 2025-07-07 NOTE — PATIENT INSTRUCTIONS
Aveed #2 given without problems.  The patient waited 30 minutes prior to leaving clinic with no indication of problems.  Return for next Aveed in 10 weeks and we will repeat the serum testosterone a month or so after that.   They require the patient to have 18 tablets and it can be 18 tablets monthly new script attached. Will forward to provider to review.

## 2025-07-11 NOTE — PROGRESS NOTES
Subjective     Patient ID: Alon Felipe is a 75 y.o. male.    Chief Complaint:  10 week follow-up for testicular hypofunction and Aveed injection with labs    This 75-year-old male presents to the clinic for his next Aveed injection, BPH, and ED.  Patient states he is doing good today.  He denies any new urological complaints.  He is pleased with the treatment of the testicular hypofunction, BPH, and erectile dysfunction.    He is tolerating the Aveed injections, Avodart, Flomax, and Cialis 5 mg without side effects.  He understands he will have to wait in the clinic 30 minutes after the injection.  His testosterone level was 708.5 with a H&H of 15.5/45.1 on July 14, 2025 and these are within normal limits.  He is pleased with the way he is voiding.  His IPSS score is 1 that reflects nocturia 1 time a night.  He denies hematuria, denies dysuria, denies incomplete bladder emptying, denies frequency, denies intermittency, denies urgency, denies weak stream, or straining.  He denies fever, chills, nausea, or vomiting.  Denies any  pains.  He denies any complaints of erectile dysfunction on the Cialis 5 mg.  His most recent PSA was 1.289 on February 24, 2025.  Through shared decision-making with the patient, he desires to have the Aveed 750 mg injection today.  He desires to continue the Avodart, Flomax, and Cialis 5 mg as prescribed.  He will get the H&H and testosterone level prior to the next visit.  We will see the patient back in the clinic in 10 weeks for his next Aveed injection and labs.  I discussed the plan in detail with the patient and he is in agreement with the plan.  All his questions were answered at today's visit.  I spent 30 minutes counseling this patient, reviewing the chart, and reviewing labs.  --------------------------------------------------------------------------------------------------------------------------------  [July 16, 2025].                Review of Systems   Constitutional:   Negative for activity change and fever.   HENT:  Negative for hearing loss and trouble swallowing.    Eyes:  Negative for visual disturbance.   Respiratory:  Negative for cough, shortness of breath and wheezing.    Cardiovascular:  Negative for chest pain.   Gastrointestinal:  Negative for abdominal pain, diarrhea, nausea and vomiting.   Endocrine: Negative for polyuria.   Genitourinary:  Positive for erectile dysfunction. Negative for bladder incontinence, decreased urine volume, difficulty urinating, discharge, dysuria, enuresis, flank pain, frequency, genital sores, hematuria, penile pain, penile swelling, scrotal swelling, testicular pain and urgency.        Testicular hypofunction/hypogonadism       BPH with LUTS   Musculoskeletal:  Negative for back pain and gait problem.   Integumentary:  Negative for rash.   Neurological:  Negative for speech difficulty and weakness.   Psychiatric/Behavioral:  Negative for behavioral problems and confusion.           Objective     Physical Exam  Vitals and nursing note reviewed.   Constitutional:       General: He is not in acute distress.     Appearance: Normal appearance. He is not ill-appearing, toxic-appearing or diaphoretic.   HENT:      Head: Normocephalic.   Eyes:      Extraocular Movements: Extraocular movements intact.   Cardiovascular:      Rate and Rhythm: Regular rhythm. Bradycardia present.      Heart sounds: Normal heart sounds.      Comments: Asymptomatic bradycardia  Pulmonary:      Effort: Pulmonary effort is normal. No respiratory distress.      Breath sounds: Normal breath sounds. No wheezing, rhonchi or rales.   Abdominal:      General: Bowel sounds are normal.      Palpations: Abdomen is soft.      Tenderness: There is no abdominal tenderness. There is no right CVA tenderness, left CVA tenderness, guarding or rebound.   Musculoskeletal:         General: Normal range of motion.      Cervical back: Normal range of motion. No rigidity.   Skin:     General:  Skin is warm and dry.   Neurological:      General: No focal deficit present.      Mental Status: He is alert and oriented to person, place, and time.      Motor: No weakness.      Coordination: Coordination normal.      Gait: Gait normal.   Psychiatric:         Mood and Affect: Mood normal.         Behavior: Behavior normal.         Thought Content: Thought content normal.          Assessment and Plan     1. Testicular hypogonadism  -     testosterone undecanoate injection 750 mg  -     Hemoglobin and Hematocrit; Future; Expected date: 09/12/2025  -     Testosterone,Total; Future; Expected date: 09/12/2025    2. Testicular hypofunction  -     testosterone undecanoate injection 750 mg  -     Hemoglobin and Hematocrit; Future; Expected date: 09/12/2025  -     Testosterone,Total; Future; Expected date: 09/12/2025    3. Benign prostatic hyperplasia with nocturia    4. Erectile dysfunction, unspecified erectile dysfunction type             Aveed 750 mg IM x1 today  H&H and testosterone level in 10 weeks  Next Aveed injection in 10 weeks  Continue tadalafil (Cialis) 5 mg 1 daily  Continue dutasteride (Avodart) 0 0.5 mg 1 daily  Continue tamsulosin (Flomax) 0.4 mg 1 at bedtime  Follow-up with urology in 10 weeks

## 2025-07-16 ENCOUNTER — OFFICE VISIT (OUTPATIENT)
Dept: UROLOGY | Facility: CLINIC | Age: 75
End: 2025-07-16
Payer: COMMERCIAL

## 2025-07-16 VITALS
DIASTOLIC BLOOD PRESSURE: 77 MMHG | BODY MASS INDEX: 26.55 KG/M2 | OXYGEN SATURATION: 94 % | WEIGHT: 196 LBS | HEIGHT: 72 IN | SYSTOLIC BLOOD PRESSURE: 157 MMHG

## 2025-07-16 DIAGNOSIS — E29.1 TESTICULAR HYPOGONADISM: Primary | Chronic | ICD-10-CM

## 2025-07-16 DIAGNOSIS — R35.1 BENIGN PROSTATIC HYPERPLASIA WITH NOCTURIA: Chronic | ICD-10-CM

## 2025-07-16 DIAGNOSIS — E29.1 TESTICULAR HYPOGONADISM: Primary | ICD-10-CM

## 2025-07-16 DIAGNOSIS — R53.83 FATIGUE, UNSPECIFIED TYPE: ICD-10-CM

## 2025-07-16 DIAGNOSIS — N52.9 ERECTILE DYSFUNCTION, UNSPECIFIED ERECTILE DYSFUNCTION TYPE: Chronic | ICD-10-CM

## 2025-07-16 DIAGNOSIS — N40.1 BENIGN PROSTATIC HYPERPLASIA WITH NOCTURIA: Chronic | ICD-10-CM

## 2025-07-16 DIAGNOSIS — E29.1 TESTICULAR HYPOFUNCTION: Chronic | ICD-10-CM

## 2025-07-16 DIAGNOSIS — E29.1 TESTICULAR HYPOFUNCTION: ICD-10-CM

## 2025-07-16 PROCEDURE — 3288F FALL RISK ASSESSMENT DOCD: CPT | Mod: S$GLB,,, | Performed by: NURSE PRACTITIONER

## 2025-07-16 PROCEDURE — 1159F MED LIST DOCD IN RCRD: CPT | Mod: S$GLB,,, | Performed by: NURSE PRACTITIONER

## 2025-07-16 PROCEDURE — 99214 OFFICE O/P EST MOD 30 MIN: CPT | Mod: 25,S$GLB,, | Performed by: NURSE PRACTITIONER

## 2025-07-16 PROCEDURE — 1160F RVW MEDS BY RX/DR IN RCRD: CPT | Mod: S$GLB,,, | Performed by: NURSE PRACTITIONER

## 2025-07-16 PROCEDURE — 99999 PR PBB SHADOW E&M-EST. PATIENT-LVL V: CPT | Mod: PBBFAC,,, | Performed by: NURSE PRACTITIONER

## 2025-07-16 PROCEDURE — 3078F DIAST BP <80 MM HG: CPT | Mod: S$GLB,,, | Performed by: NURSE PRACTITIONER

## 2025-07-16 PROCEDURE — 96372 THER/PROPH/DIAG INJ SC/IM: CPT | Mod: S$GLB,,, | Performed by: NURSE PRACTITIONER

## 2025-07-16 PROCEDURE — 3077F SYST BP >= 140 MM HG: CPT | Mod: S$GLB,,, | Performed by: NURSE PRACTITIONER

## 2025-07-16 PROCEDURE — 1101F PT FALLS ASSESS-DOCD LE1/YR: CPT | Mod: S$GLB,,, | Performed by: NURSE PRACTITIONER

## 2025-07-16 NOTE — PROGRESS NOTES
Nurse identified pt by name, , facial recognition--checked allergies--medication is precerted--with vince Aveed 750mg/3ml from [employer]--vince medicine up in 5ml syringe with 18g needle--changed needles to 22g 1 1/2 in needle--washed hands and donned gloves--cleaned site to left gluteus medius with alcohol pads x 2 and air dried--injected needle into site and aspirated x 2 for blood return and got none--slowly injected medication into site over 2 minutes--withdrew needle and held pressure over site x 1 minute--applied bandaid--pt tolerated injection well--left clinic in stable condition with his AVS/instructions---------------------------------------------------------------------------------------------XOCHITL Woodall, RN